# Patient Record
Sex: MALE | Race: WHITE | Employment: PART TIME | URBAN - METROPOLITAN AREA
[De-identification: names, ages, dates, MRNs, and addresses within clinical notes are randomized per-mention and may not be internally consistent; named-entity substitution may affect disease eponyms.]

---

## 2019-11-26 ENCOUNTER — HOSPITAL ENCOUNTER (EMERGENCY)
Age: 34
Discharge: HOME | End: 2019-11-26
Attending: EMERGENCY MEDICINE
Payer: COMMERCIAL

## 2019-11-26 ENCOUNTER — APPOINTMENT (OUTPATIENT)
Dept: GENERAL RADIOLOGY | Age: 34
End: 2019-11-26
Payer: COMMERCIAL

## 2019-11-26 VITALS
SYSTOLIC BLOOD PRESSURE: 132 MMHG | WEIGHT: 180 LBS | BODY MASS INDEX: 25.77 KG/M2 | TEMPERATURE: 97.8 F | RESPIRATION RATE: 16 BRPM | HEIGHT: 70 IN | OXYGEN SATURATION: 98 % | HEART RATE: 80 BPM | DIASTOLIC BLOOD PRESSURE: 68 MMHG

## 2019-11-26 DIAGNOSIS — M25.572 CHRONIC PAIN OF BOTH ANKLES: Primary | ICD-10-CM

## 2019-11-26 DIAGNOSIS — G89.29 CHRONIC PAIN OF BOTH ANKLES: Primary | ICD-10-CM

## 2019-11-26 DIAGNOSIS — M25.571 CHRONIC PAIN OF BOTH ANKLES: Primary | ICD-10-CM

## 2019-11-26 PROCEDURE — 73630 X-RAY EXAM OF FOOT: CPT

## 2019-11-26 PROCEDURE — 73610 X-RAY EXAM OF ANKLE: CPT

## 2019-11-26 PROCEDURE — 99283 EMERGENCY DEPT VISIT LOW MDM: CPT

## 2019-11-26 RX ORDER — ASCORBIC ACID 500 MG
500 TABLET ORAL 2 TIMES DAILY
COMMUNITY
End: 2020-10-20 | Stop reason: ALTCHOICE

## 2019-11-26 RX ORDER — IBUPROFEN 400 MG/1
400 TABLET ORAL EVERY 6 HOURS PRN
COMMUNITY
End: 2020-10-20 | Stop reason: ALTCHOICE

## 2019-11-26 RX ORDER — ASPIRIN 325 MG
325 TABLET ORAL DAILY
COMMUNITY

## 2019-11-26 ASSESSMENT — PAIN DESCRIPTION - LOCATION: LOCATION: FOOT;ANKLE

## 2019-11-26 ASSESSMENT — PAIN SCALES - GENERAL: PAINLEVEL_OUTOF10: 7

## 2019-11-26 ASSESSMENT — PAIN DESCRIPTION - PAIN TYPE: TYPE: ACUTE PAIN

## 2019-11-26 ASSESSMENT — PAIN DESCRIPTION - ORIENTATION: ORIENTATION: RIGHT;LEFT

## 2019-12-04 ENCOUNTER — HOSPITAL ENCOUNTER (EMERGENCY)
Age: 34
Discharge: HOME | End: 2019-12-04
Attending: EMERGENCY MEDICINE
Payer: COMMERCIAL

## 2019-12-04 ENCOUNTER — APPOINTMENT (OUTPATIENT)
Dept: GENERAL RADIOLOGY | Age: 34
End: 2019-12-04
Payer: COMMERCIAL

## 2019-12-04 VITALS
HEIGHT: 70 IN | WEIGHT: 180 LBS | OXYGEN SATURATION: 98 % | HEART RATE: 90 BPM | RESPIRATION RATE: 16 BRPM | TEMPERATURE: 98.7 F | DIASTOLIC BLOOD PRESSURE: 56 MMHG | BODY MASS INDEX: 25.77 KG/M2 | SYSTOLIC BLOOD PRESSURE: 130 MMHG

## 2019-12-04 DIAGNOSIS — J40 BRONCHITIS: Primary | ICD-10-CM

## 2019-12-04 LAB
DIRECT EXAM: NORMAL
Lab: NORMAL
SPECIMEN DESCRIPTION: NORMAL

## 2019-12-04 PROCEDURE — 71046 X-RAY EXAM CHEST 2 VIEWS: CPT

## 2019-12-04 PROCEDURE — 87880 STREP A ASSAY W/OPTIC: CPT

## 2019-12-04 PROCEDURE — 99283 EMERGENCY DEPT VISIT LOW MDM: CPT

## 2019-12-04 RX ORDER — GUAIFENESIN/DEXTROMETHORPHAN 100-10MG/5
5 SYRUP ORAL 3 TIMES DAILY PRN
Qty: 120 ML | Refills: 0 | Status: SHIPPED | OUTPATIENT
Start: 2019-12-04 | End: 2019-12-14

## 2019-12-04 RX ORDER — METHYLPREDNISOLONE 4 MG/1
TABLET ORAL
Qty: 1 KIT | Refills: 0 | Status: SHIPPED | OUTPATIENT
Start: 2019-12-04 | End: 2019-12-10

## 2019-12-04 RX ORDER — PSEUDOEPHEDRINE HYDROCHLORIDE 30 MG/1
30 TABLET ORAL EVERY 4 HOURS PRN
Qty: 24 TABLET | Refills: 1 | Status: SHIPPED | OUTPATIENT
Start: 2019-12-04 | End: 2020-10-20 | Stop reason: ALTCHOICE

## 2019-12-04 SDOH — HEALTH STABILITY: MENTAL HEALTH: HOW OFTEN DO YOU HAVE A DRINK CONTAINING ALCOHOL?: NEVER

## 2019-12-04 ASSESSMENT — PAIN SCALES - GENERAL: PAINLEVEL_OUTOF10: 4

## 2019-12-04 ASSESSMENT — PAIN DESCRIPTION - ORIENTATION: ORIENTATION: RIGHT

## 2019-12-04 ASSESSMENT — PAIN DESCRIPTION - LOCATION: LOCATION: EAR

## 2019-12-04 ASSESSMENT — PAIN DESCRIPTION - PAIN TYPE: TYPE: ACUTE PAIN

## 2020-10-20 ENCOUNTER — OFFICE VISIT (OUTPATIENT)
Dept: FAMILY MEDICINE CLINIC | Age: 35
End: 2020-10-20
Payer: COMMERCIAL

## 2020-10-20 VITALS
HEART RATE: 96 BPM | OXYGEN SATURATION: 98 % | SYSTOLIC BLOOD PRESSURE: 132 MMHG | DIASTOLIC BLOOD PRESSURE: 72 MMHG | HEIGHT: 68 IN | TEMPERATURE: 98.3 F | WEIGHT: 189.6 LBS | BODY MASS INDEX: 28.73 KG/M2

## 2020-10-20 PROBLEM — F17.210 NICOTINE DEPENDENCE, CIGARETTES, UNCOMPLICATED: Status: ACTIVE | Noted: 2019-07-16

## 2020-10-20 PROCEDURE — 99204 OFFICE O/P NEW MOD 45 MIN: CPT | Performed by: FAMILY MEDICINE

## 2020-10-20 PROCEDURE — G8484 FLU IMMUNIZE NO ADMIN: HCPCS | Performed by: FAMILY MEDICINE

## 2020-10-20 PROCEDURE — 4004F PT TOBACCO SCREEN RCVD TLK: CPT | Performed by: FAMILY MEDICINE

## 2020-10-20 PROCEDURE — G8419 CALC BMI OUT NRM PARAM NOF/U: HCPCS | Performed by: FAMILY MEDICINE

## 2020-10-20 PROCEDURE — G8427 DOCREV CUR MEDS BY ELIG CLIN: HCPCS | Performed by: FAMILY MEDICINE

## 2020-10-20 SDOH — ECONOMIC STABILITY: FOOD INSECURITY: WITHIN THE PAST 12 MONTHS, THE FOOD YOU BOUGHT JUST DIDN'T LAST AND YOU DIDN'T HAVE MONEY TO GET MORE.: SOMETIMES TRUE

## 2020-10-20 SDOH — ECONOMIC STABILITY: TRANSPORTATION INSECURITY
IN THE PAST 12 MONTHS, HAS THE LACK OF TRANSPORTATION KEPT YOU FROM MEDICAL APPOINTMENTS OR FROM GETTING MEDICATIONS?: NO

## 2020-10-20 SDOH — ECONOMIC STABILITY: TRANSPORTATION INSECURITY
IN THE PAST 12 MONTHS, HAS LACK OF TRANSPORTATION KEPT YOU FROM MEETINGS, WORK, OR FROM GETTING THINGS NEEDED FOR DAILY LIVING?: NO

## 2020-10-20 SDOH — ECONOMIC STABILITY: FOOD INSECURITY: WITHIN THE PAST 12 MONTHS, YOU WORRIED THAT YOUR FOOD WOULD RUN OUT BEFORE YOU GOT MONEY TO BUY MORE.: SOMETIMES TRUE

## 2020-10-20 SDOH — ECONOMIC STABILITY: INCOME INSECURITY: HOW HARD IS IT FOR YOU TO PAY FOR THE VERY BASICS LIKE FOOD, HOUSING, MEDICAL CARE, AND HEATING?: HARD

## 2020-10-20 ASSESSMENT — ENCOUNTER SYMPTOMS
APNEA: 0
CHEST TIGHTNESS: 0
SHORTNESS OF BREATH: 1
NAUSEA: 0
COUGH: 1
VOICE CHANGE: 0
ABDOMINAL DISTENTION: 0
COLOR CHANGE: 0
DIARRHEA: 0
CONSTIPATION: 0
ABDOMINAL PAIN: 0
TROUBLE SWALLOWING: 0
EYE DISCHARGE: 0
WHEEZING: 0

## 2020-10-20 ASSESSMENT — PATIENT HEALTH QUESTIONNAIRE - PHQ9
SUM OF ALL RESPONSES TO PHQ QUESTIONS 1-9: 0
SUM OF ALL RESPONSES TO PHQ QUESTIONS 1-9: 0
1. LITTLE INTEREST OR PLEASURE IN DOING THINGS: 0
SUM OF ALL RESPONSES TO PHQ QUESTIONS 1-9: 0
2. FEELING DOWN, DEPRESSED OR HOPELESS: 0
SUM OF ALL RESPONSES TO PHQ9 QUESTIONS 1 & 2: 0

## 2020-10-20 NOTE — PROGRESS NOTES
felt like glass cutting him in his chest.  Cinder block weight sensation on his chest.  Arm would go numb. Sitting position frequently at home, Lots of computer. Lying down frequently due to crushing pain at times. Laughing can aggrivate it. 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 Head injury- hit an  I beam, steel, and required 7 staples in his head, positive loss of consciousness. He says there is a  History of peripheral neuropathy, fibromyalgia diagnosis for him in the past      Current Outpatient Medications on File Prior to Visit   Medication Sig Dispense Refill    aspirin 325 MG tablet Take 325 mg by mouth daily       No current facility-administered medications on file prior to visit.       Past Medical History:   Diagnosis Date    Mononucleosis     as child     Past Surgical History:   Procedure Laterality Date    WISDOM TOOTH EXTRACTION Left     bottom     Social History     Socioeconomic History    Marital status: Single     Spouse name: Not on file    Number of children: Not on file    Years of education: Not on file    Highest education level: Not on file   Occupational History    Not on file   Social Needs    Financial resource strain: Hard    Food insecurity     Worry: Sometimes true     Inability: Sometimes true    Transportation needs     Medical: No     Non-medical: No   Tobacco Use    Smoking status: Current Every Day Smoker     Packs/day: 1.00     Years: 20.00     Pack years: 20.00     Types: Cigarettes    Smokeless tobacco: Never Used   Substance and Sexual Activity    Alcohol use: Never     Frequency: Never     Comment: rarely    Drug use: Never    Sexual activity: Not on file   Lifestyle    Physical activity     Days per week: Not on file     Minutes per session: Not on file    Stress: Not on file   Relationships    Social connections     Talks on phone: Not on file     Gets together: Not on file     Attends Sabian[employer] service: Not on Head: Normocephalic and atraumatic. Right Ear: External ear normal.      Left Ear: External ear normal.      Nose: Nose normal.   Eyes:      General:         Right eye: No discharge. Left eye: No discharge. Conjunctiva/sclera: Conjunctivae normal.      Pupils: Pupils are equal, round, and reactive to light. Neck:      Musculoskeletal: Neck supple. Thyroid: No thyromegaly. Cardiovascular:      Rate and Rhythm: Normal rate and regular rhythm. Pulmonary:      Effort: Pulmonary effort is normal. No respiratory distress. Abdominal:      General: There is no distension. Skin:     General: Skin is warm and dry. Neurological:      Mental Status: He is alert and oriented to person, place, and time. Coordination: Coordination normal.   Psychiatric:         Thought Content: Thought content normal.         Judgment: Judgment normal.         No results found for this visit on 10/20/20. No results found for this or any previous visit (from the past 2016 hour(s)). Assessment:       Diagnosis Orders   1. Chest pain, unspecified type  Lipid, Fasting    CBC Auto Differential    Comprehensive Metabolic Panel    Full PFT Study With Bronchodilator    XR CHEST (2 VW)   2. Shortness of breath  Full PFT Study With Bronchodilator    XR CHEST (2 VW)   3. Cervical radiculopathy  XR CERVICAL SPINE (4-5 VIEWS)   4.  Nicotine dependence, cigarettes, uncomplicated           Orders Placed This Encounter   Procedures    XR CHEST (2 VW)     Standing Status:   Future     Standing Expiration Date:   10/20/2021    XR CERVICAL SPINE (4-5 VIEWS)     Standing Status:   Future     Standing Expiration Date:   10/20/2021     Order Specific Question:   Reason for exam:     Answer:   chest pain, arm numb    Lipid, Fasting     Standing Status:   Future     Standing Expiration Date:   10/20/2021    CBC Auto Differential     Standing Status:   Future     Standing Expiration Date:   10/20/2021    Comprehensive Metabolic Panel     Standing Status:   Future     Standing Expiration Date:   10/20/2021    Full PFT Study With Bronchodilator     Standing Status:   Future     Standing Expiration Date:   10/20/2021         Plan:   Return in about 4 weeks (around 11/17/2020) for for review of outcome of today's recommendation. Patient Instructions   Discussed with patient rationale for ordering each of the test.  At this point I do not see an indication for cardiac testing. His history is much more consistent with long abnormalities as well as potential neck injury with subsequent radiculopathy. This note was partially created with the assistance of dictation. This may lead to grammatical or spelling errors. Roel Mccarthy M.D.

## 2020-10-20 NOTE — PATIENT INSTRUCTIONS
Discussed with patient rationale for ordering each of the test.  At this point I do not see an indication for cardiac testing. His history is much more consistent with long abnormalities as well as potential neck injury with subsequent radiculopathy.

## 2021-03-01 ENCOUNTER — TELEPHONE (OUTPATIENT)
Dept: FAMILY MEDICINE CLINIC | Age: 36
End: 2021-03-01

## 2021-03-01 NOTE — TELEPHONE ENCOUNTER
Pt is asking for a work note stating that the pt is not able to function at work at this time. Pt is having a lot of pressure. When pt wears a mask it intensifies the symptoms. Pt feels exhausted after moving very little. Please advise. Pt's phone number is [phone]. Pt is scheduled for a vv appt. Tomorrow.

## 2021-03-02 ENCOUNTER — VIRTUAL VISIT (OUTPATIENT)
Dept: FAMILY MEDICINE CLINIC | Age: 36
End: 2021-03-02
Payer: COMMERCIAL

## 2021-03-02 DIAGNOSIS — R53.83 OTHER FATIGUE: ICD-10-CM

## 2021-03-02 DIAGNOSIS — M79.601 PARESTHESIA AND PAIN OF BOTH UPPER EXTREMITIES: ICD-10-CM

## 2021-03-02 DIAGNOSIS — R20.2 PARESTHESIA AND PAIN OF BOTH UPPER EXTREMITIES: ICD-10-CM

## 2021-03-02 DIAGNOSIS — M79.602 PARESTHESIA AND PAIN OF BOTH UPPER EXTREMITIES: ICD-10-CM

## 2021-03-02 DIAGNOSIS — R07.9 CHEST PAIN, UNSPECIFIED TYPE: Primary | ICD-10-CM

## 2021-03-02 PROCEDURE — G8427 DOCREV CUR MEDS BY ELIG CLIN: HCPCS | Performed by: FAMILY MEDICINE

## 2021-03-02 PROCEDURE — 99214 OFFICE O/P EST MOD 30 MIN: CPT | Performed by: FAMILY MEDICINE

## 2021-03-02 ASSESSMENT — PATIENT HEALTH QUESTIONNAIRE - PHQ9
SUM OF ALL RESPONSES TO PHQ QUESTIONS 1-9: 0
SUM OF ALL RESPONSES TO PHQ QUESTIONS 1-9: 0

## 2021-03-02 NOTE — PROGRESS NOTES
organization: Not on file     Attends meetings of clubs or organizations: Not on file     Relationship status: Not on file    Intimate partner violence     Fear of current or ex partner: Not on file     Emotionally abused: Not on file     Physically abused: Not on file     Forced sexual activity: Not on file   Other Topics Concern    Not on file   Social History Narrative    Not on file     Family History   Problem Relation Age of Onset    No Known Problems Mother     Heart Disease Father      Allergies:  Patient has no known allergies. Review of Systems   Constitutional: Positive for fatigue. Negative for appetite change, chills and fever. HENT: Negative for congestion and sore throat. Respiratory: Positive for shortness of breath. Negative for cough and chest tightness. Cardiovascular: Positive for chest pain. Negative for palpitations and leg swelling. PHYSICAL EXAM/ RESULTS    (Limited exam: only performed what is available through a visual exam during the video encounter as indicated due to the restrictions of the COVID-19 pandemic)    Patient-Reported Vitals 3/2/2021   Patient-Reported Weight 180lbs   Patient-Reported Height 5 8             Physical Exam  Vitals signs (All exam findings are noted during patient movement during video exam) reviewed. Constitutional:       General: He is not in acute distress. Appearance: Normal appearance. He is well-developed. He is not ill-appearing or diaphoretic. HENT:      Head: Normocephalic and atraumatic. No right periorbital erythema or left periorbital erythema. Hair is normal.      Jaw: No swelling or pain on movement. Right Ear: Hearing and external ear normal.      Left Ear: Hearing and external ear normal.      Ears:      Comments: External ears are normal shape and even level placement on the head     Nose: No nasal deformity. Right Nostril: No epistaxis. Left Nostril: No epistaxis. Mouth/Throat:      Lips: Pink. Comments: Lips have appropriate movement with conversation  Eyes:      General: Lids are normal. Vision grossly intact. Gaze aligned appropriately. No allergic shiner. Right eye: No discharge. Left eye: No discharge. Extraocular Movements: Extraocular movements intact. Conjunctiva/sclera: Conjunctivae normal.      Comments: Pupils equal   Neck:      Musculoskeletal: Normal range of motion. No erythema or pain with movement. Thyroid: No thyromegaly. Trachea: No tracheal deviation. Pulmonary:      Effort: No tachypnea, accessory muscle usage or respiratory distress. Comments: No difficulty with conversation  Musculoskeletal:      Comments: Range of motion of upper and lower extremity large muscle groups are normal during ambulation. Gait normal   Skin:     Coloration: Skin is not cyanotic, jaundiced or pale. Findings: No acne. Comments: No evidence of abnormal hair loss. Facial skin without defect. Neurological:      Mental Status: He is alert and oriented to person, place, and time. Cranial Nerves: No cranial nerve deficit, dysarthria or facial asymmetry. Coordination: Coordination normal.      Gait: Gait is intact. Comments: Motor function intact for gait and range of motion of large muscle groups of extremities   Psychiatric:         Attention and Perception: Attention and perception normal.         Mood and Affect: Mood and affect normal.         Speech: Speech normal.         Behavior: Behavior normal. Behavior is cooperative. Thought Content: Thought content normal.         Judgment: Judgment normal.      Comments: Slow speech pattern         No results found for this or any previous visit (from the past 2016 hour(s)). Assessment:       Diagnosis Orders   1. Chest pain, unspecified type  CBC Auto Differential    Comprehensive Metabolic Panel    TSH with Reflex   2.  Other fatigue  CBC Auto Differential    Comprehensive Metabolic Panel    TSH with Reflex    Testosterone Free and Total Male   3. Paresthesia and pain of both upper extremities  CBC Auto Differential    Comprehensive Metabolic Panel    TSH with Reflex         Orders Placed This Encounter   Procedures    CBC Auto Differential     Standing Status:   Future     Number of Occurrences:   1     Standing Expiration Date:   3/2/2022    Comprehensive Metabolic Panel     Standing Status:   Future     Number of Occurrences:   1     Standing Expiration Date:   3/2/2022    TSH with Reflex     Standing Status:   Future     Number of Occurrences:   1     Standing Expiration Date:   3/2/2022    Testosterone Free and Total Male     Standing Status:   Future     Number of Occurrences:   1     Standing Expiration Date:   3/2/2022         Plan:   Return in about 3 days (around 3/5/2021) for off work note for today through friday. Patient Instructions   Patient seems to demonstrate slow mentation during conversation. High suspect for thyroid disease. Labs ordered. In person appointment in 3 days to discuss results and come up with next injury plan plus evaluate patient's chest pain symptoms with a respiratory exam.      This visit ended at do not recall    The resources used for this visit were [employer] for chart access and [email or web address]      Lio Mcneal M.D. An  electronic signature was used to authenticate this note. --Constance Prakash MD on 3/3/2021 at 6:29 PM        Pursuant to the emergency declaration under the [facility] waiver authority and the [employer] Act, this Virtual  Visit was conducted, with patient's consent, to reduce the patient's risk of exposure to COVID-19 and provide continuity of care for an established patient. Services were provided through a video synchronous discussion virtually to substitute for in-person clinic visit.

## 2021-03-03 DIAGNOSIS — R07.9 CHEST PAIN, UNSPECIFIED TYPE: ICD-10-CM

## 2021-03-03 DIAGNOSIS — M79.601 PARESTHESIA AND PAIN OF BOTH UPPER EXTREMITIES: ICD-10-CM

## 2021-03-03 DIAGNOSIS — M79.602 PARESTHESIA AND PAIN OF BOTH UPPER EXTREMITIES: ICD-10-CM

## 2021-03-03 DIAGNOSIS — R20.2 PARESTHESIA AND PAIN OF BOTH UPPER EXTREMITIES: ICD-10-CM

## 2021-03-03 DIAGNOSIS — R53.83 OTHER FATIGUE: ICD-10-CM

## 2021-03-03 LAB
ALBUMIN SERPL-MCNC: 4.6 G/DL (ref 3.5–4.6)
ALP BLD-CCNC: 92 U/L (ref 35–104)
ALT SERPL-CCNC: 15 U/L (ref 0–41)
ANION GAP SERPL CALCULATED.3IONS-SCNC: 11 MEQ/L (ref 9–15)
AST SERPL-CCNC: 16 U/L (ref 0–40)
BASOPHILS ABSOLUTE: 0.1 K/UL (ref 0–0.2)
BASOPHILS RELATIVE PERCENT: 1.2 %
BILIRUB SERPL-MCNC: 0.5 MG/DL (ref 0.2–0.7)
BUN BLDV-MCNC: 8 MG/DL (ref 6–20)
CALCIUM SERPL-MCNC: 9.8 MG/DL (ref 8.5–9.9)
CHLORIDE BLD-SCNC: 104 MEQ/L (ref 95–107)
CO2: 25 MEQ/L (ref 20–31)
CREAT SERPL-MCNC: 0.91 MG/DL (ref 0.7–1.2)
EOSINOPHILS ABSOLUTE: 0.1 K/UL (ref 0–0.7)
EOSINOPHILS RELATIVE PERCENT: 1 %
GFR AFRICAN AMERICAN: >60
GFR NON-AFRICAN AMERICAN: >60
GLOBULIN: 3.1 G/DL (ref 2.3–3.5)
GLUCOSE BLD-MCNC: 90 MG/DL (ref 70–99)
HCT VFR BLD CALC: 42.6 % (ref 42–52)
HEMOGLOBIN: 14.2 G/DL (ref 14–18)
LYMPHOCYTES ABSOLUTE: 2.8 K/UL (ref 1–4.8)
LYMPHOCYTES RELATIVE PERCENT: 28.2 %
MCH RBC QN AUTO: 30 PG (ref 27–31.3)
MCHC RBC AUTO-ENTMCNC: 33.4 % (ref 33–37)
MCV RBC AUTO: 90 FL (ref 80–100)
MONOCYTES ABSOLUTE: 0.4 K/UL (ref 0.2–0.8)
MONOCYTES RELATIVE PERCENT: 4.5 %
NEUTROPHILS ABSOLUTE: 6.4 K/UL (ref 1.4–6.5)
NEUTROPHILS RELATIVE PERCENT: 65.1 %
PDW BLD-RTO: 13.3 % (ref 11.5–14.5)
PLATELET # BLD: 211 K/UL (ref 130–400)
POTASSIUM SERPL-SCNC: 3.8 MEQ/L (ref 3.4–4.9)
RBC # BLD: 4.74 M/UL (ref 4.7–6.1)
SODIUM BLD-SCNC: 140 MEQ/L (ref 135–144)
TOTAL PROTEIN: 7.7 G/DL (ref 6.3–8)
TSH REFLEX: 1.47 UIU/ML (ref 0.44–3.86)
WBC # BLD: 9.9 K/UL (ref 4.8–10.8)

## 2021-03-03 ASSESSMENT — ENCOUNTER SYMPTOMS
CHEST TIGHTNESS: 0
SORE THROAT: 0
SHORTNESS OF BREATH: 1
COUGH: 0

## 2021-03-03 ASSESSMENT — VISUAL ACUITY: OU: 1

## 2021-03-05 ENCOUNTER — OFFICE VISIT (OUTPATIENT)
Dept: FAMILY MEDICINE CLINIC | Age: 36
End: 2021-03-05
Payer: COMMERCIAL

## 2021-03-05 VITALS
HEART RATE: 70 BPM | TEMPERATURE: 97.5 F | OXYGEN SATURATION: 99 % | HEIGHT: 68 IN | SYSTOLIC BLOOD PRESSURE: 128 MMHG | DIASTOLIC BLOOD PRESSURE: 68 MMHG | WEIGHT: 187.5 LBS | BODY MASS INDEX: 28.42 KG/M2

## 2021-03-05 DIAGNOSIS — R53.83 OTHER FATIGUE: ICD-10-CM

## 2021-03-05 DIAGNOSIS — K21.9 GASTROESOPHAGEAL REFLUX DISEASE WITHOUT ESOPHAGITIS: Primary | ICD-10-CM

## 2021-03-05 DIAGNOSIS — Z72.0 TOBACCO ABUSE: ICD-10-CM

## 2021-03-05 LAB
SEX HORMONE BINDING GLOBULIN: 19 NMOL/L (ref 11–80)
TESTOSTERONE FREE PERCENT: 2.3 % (ref 1.6–2.9)
TESTOSTERONE FREE, CALC: 82 PG/ML (ref 47–244)
TESTOSTERONE TOTAL-MALE: 351 NG/DL (ref 300–1080)

## 2021-03-05 PROCEDURE — G8427 DOCREV CUR MEDS BY ELIG CLIN: HCPCS | Performed by: FAMILY MEDICINE

## 2021-03-05 PROCEDURE — G8419 CALC BMI OUT NRM PARAM NOF/U: HCPCS | Performed by: FAMILY MEDICINE

## 2021-03-05 PROCEDURE — 99214 OFFICE O/P EST MOD 30 MIN: CPT | Performed by: FAMILY MEDICINE

## 2021-03-05 PROCEDURE — G8484 FLU IMMUNIZE NO ADMIN: HCPCS | Performed by: FAMILY MEDICINE

## 2021-03-05 PROCEDURE — 4004F PT TOBACCO SCREEN RCVD TLK: CPT | Performed by: FAMILY MEDICINE

## 2021-03-05 RX ORDER — OMEPRAZOLE 20 MG/1
20 CAPSULE, DELAYED RELEASE ORAL
Qty: 60 CAPSULE | Refills: 5 | Status: SHIPPED | OUTPATIENT
Start: 2021-03-05 | End: 2022-04-28

## 2021-03-05 NOTE — PROGRESS NOTES
Diagnosis Orders   1. Gastroesophageal reflux disease without esophagitis  omeprazole (PRILOSEC) 20 MG delayed release capsule   2. Tobacco abuse     3. Other fatigue       Return for To be determined after labs completed. Patient Instructions   Initiate omeprazole twice a day. Discontinue smoking. Await testosterone labs. If testosterone is negative as cause of symptoms will look into psychiatric and neurologic potential causes. Patient Education        Stopping Smokeless Tobacco Use: Care Instructions  Your Care Instructions     Smokeless tobacco comes in many forms, such as snuff and chewing tobacco:  · Snuff is finely ground tobacco sold in cans or pouches. Most of the time, snuff is used by putting a \"pinch\" or \"dip\" between the lower lip or cheek and the gum. · Chewing tobacco is sold as loose leaves, plugs, or twists. It is chewed or placed between the cheek and the gum or teeth. There are plenty of reasons to stop using smokeless tobacco. These products are harmful. They are not risk-free alternatives to smoking. Smokeless tobacco contains nicotine, which is addicting. Though using smokeless tobacco is less harmful than smoking cigarettes, it can cause serious health problems, such as:  · White patches or red sores in your mouth that can turn into mouth cancer involving the lip, tongue, or cheek. · Tooth loss and other dental problems. · Gum disease. Your gums may pull away from your teeth and not grow back. People who use smokeless tobacco crave the nicotine in it. Giving up smokeless tobacco is much harder than simply changing a habit. Your body has to stop craving the nicotine. It is hard to quit, but you can do it. Many tools are available for people who want to quit using smokeless tobacco. You may find that combining tools works best for you. There are several steps to quitting. First you get ready to quit. Then you get support to help you.  After that, you learn new skills and behaviors to quit. For many people, a necessary step is getting and using medicine. Your doctor will help you set up the plan that best meets your needs. You may want to attend a tobacco cessation program. When you choose a program, look for one that has proven success. Ask your doctor for ideas. You will greatly increase your chances of success if you take medicine as well as get counseling or join a cessation program.  Some of the changes you feel when you first quit smokeless tobacco are uncomfortable. Your body will miss the nicotine at first, and you may feel short-tempered and grumpy. You may have trouble sleeping or concentrating. Medicine can help you deal with these symptoms. You may struggle with changing your habits and rituals. The last step is the tricky one: Be prepared for the urge to use smokeless tobacco to continue for a time. This is a lot to deal with, but keep at it. You will feel better. Follow-up care is a key part of your treatment and safety. Be sure to make and go to all appointments, and call your doctor if you are having problems. It's also a good idea to know your test results and keep a list of the medicines you take. How can you care for yourself at home? · Ask your family, friends, and coworkers for support. You have a better chance of quitting if you have help and support. · Join a support group for people who are trying to quit using smokeless tobacco.  · Set a quit date. Pick your date carefully so that it is not right in the middle of a big deadline or stressful time. After you quit, do not use smokeless tobacco even once. Get rid of all spit cups, cans, and pouches after your last use. Clean your house and your clothes so that they do not smell of tobacco.  · Learn how to be a non-user. Think about ways you can avoid those things that make you reach for tobacco.  ? Learn some ways to deal with cravings, like calling a friend or going for a walk. Cravings often pass. ?  Avoid situations that put you at greatest risk for using smokeless tobacco. For some people, it is hard to spend time with friends without dipping or chewing. For others, they might skip a coffee break with coworkers who smoke or use smokeless tobacco.  ? Change your daily routine. Take a different route to work, or eat a meal in a different place. · Cut down on stress. Calm yourself or release tension by doing an activity you enjoy, such as reading a book, taking a hot bath, or gardening. · Talk to your doctor or pharmacist about nicotine replacement therapy. You still get nicotine, but you do not use tobacco. Nicotine replacement products help you slowly reduce the amount of nicotine you need. Many of these products are available over the counter. They include nicotine patches, gum, lozenges, and inhalers. · Ask your doctor about bupropion (Wellbutrin) or varenicline (Chantix), which are prescription medicines. They do not contain nicotine. They help you by reducing withdrawal symptoms, such as stress and anxiety. · Get regular exercise. Having healthy habits will help your body move past its craving for nicotine. · Be prepared to keep trying. Most people are not successful the first few times they try to quit. Do not get mad at yourself if you use tobacco again. Make a list of things you learned, and think about when you want to try again, such as next week, next month, or next year. Where can you learn more? Go to https://GreenTec-USAsemaj.healthNeedFeed. org and sign in to your [employer] account. Enter I491 in the [facility] box to learn more about \"Stopping Smokeless Tobacco Use: Care Instructions. \"     If you do not have an account, please click on the \"Sign Up Now\" link. Current as of: March 12, 2020               Content Version: 12.6  © 6938-3369 [employer]. Care instructions adapted under license by [facility] ([facility]).  If you have questions about a medical condition or this instruction, always ask your healthcare professional. [place] [id number] any warranty or liability for your use of this information. Subjective:      Patient ID: Mukund Davila is a 38 y.o. male who presents for:  Chief Complaint   Patient presents with    Chest Pain     3 day follow up - Pt states that there has been no change in his chest SX       Takes a lot of effort to move. Both sides of the body equally. He \"does not feel young anymore\"  Feels like he has a subtle burning chronic sensation in his arms. If he sleeps 7 hours he wakes up in pain. Never feels good. Only time any change is in the water, like a jacuzzi, he feels better. He will often sleep after that. In that instance he wakes from the nap feeling somewhat better. He notes an increase in acid reflux, refluxed into mouth that woke him up this week. He has taken medication for acid in the past it did help    He notes intermittent lymph node swelling on L neck and L axilla. Often wakes sweating. Current Outpatient Medications on File Prior to Visit   Medication Sig Dispense Refill    aspirin 325 MG tablet Take 325 mg by mouth daily       No current facility-administered medications on file prior to visit.       Past Medical History:   Diagnosis Date    Mononucleosis     as child     Past Surgical History:   Procedure Laterality Date    WISDOM TOOTH EXTRACTION Left     bottom     Social History     Socioeconomic History    Marital status: Single     Spouse name: Not on file    Number of children: Not on file    Years of education: Not on file    Highest education level: Not on file   Occupational History    Not on file   Social Needs    Financial resource strain: Hard    Food insecurity     Worry: Sometimes true     Inability: Sometimes true    Transportation needs     Medical: No     Non-medical: No   Tobacco Use    Smoking status: Current Every Day Smoker     Packs/day: 1.00     Years: 20.00     Pack years: 20.00     Types: Cigarettes    Smokeless tobacco: Never Used   Substance and Sexual Activity    Alcohol use: Never     Frequency: Never     Comment: rarely    Drug use: Never    Sexual activity: Not on file   Lifestyle    Physical activity     Days per week: Not on file     Minutes per session: Not on file    Stress: Not on file   Relationships    Social connections     Talks on phone: Not on file     Gets together: Not on file     Attends Hoahaoism service: Not on file     Active member of club or organization: Not on file     Attends meetings of clubs or organizations: Not on file     Relationship status: Not on file    Intimate partner violence     Fear of current or ex partner: Not on file     Emotionally abused: Not on file     Physically abused: Not on file     Forced sexual activity: Not on file   Other Topics Concern    Not on file   Social History Narrative    Not on file     Family History   Problem Relation Age of Onset    No Known Problems Mother     Heart Disease Father      Allergies:  Patient has no known allergies. Review of Systems   Constitutional:        See HPI       Objective:   /68   Pulse 70   Temp 97.5 °F (36.4 °C)   Ht 5' 8\" (1.727 m)   Wt 187 lb 8 oz (85 kg)   SpO2 99%   BMI 28.51 kg/m²     Physical Exam  Constitutional:       Appearance: Normal appearance. He is well-developed. He is not ill-appearing or diaphoretic. Comments: Vitals signs reviewed   HENT:      Head: Normocephalic and atraumatic. Right Ear: Hearing and external ear normal.      Left Ear: Hearing and external ear normal.      Nose: No nasal deformity or rhinorrhea. Eyes:      General: Lids are normal.         Right eye: No discharge. Left eye: No discharge. Extraocular Movements:      Right eye: No nystagmus. Left eye: No nystagmus. Conjunctiva/sclera: Conjunctivae normal.      Right eye: No hemorrhage. Left eye: No hemorrhage.      Pupils: Pupils are equal, round, and reactive to light. Neck:      Musculoskeletal: Full passive range of motion without pain and neck supple. Normal range of motion. Thyroid: No thyroid mass or thyromegaly. Vascular: Normal carotid pulses. No carotid bruit or JVD. Trachea: No tracheal tenderness or tracheal deviation. Cardiovascular:      Rate and Rhythm: Normal rate and regular rhythm. Chest Wall: PMI displaced: normal location PMI. Pulses:           Carotid pulses are 2+ on the right side and 2+ on the left side. Radial pulses are 2+ on the right side and 2+ on the left side. Heart sounds: S1 normal and S2 normal. No murmur. No friction rub. No gallop. No S3 sounds. Pulmonary:      Effort: Pulmonary effort is normal. No accessory muscle usage or respiratory distress. Breath sounds: Normal breath sounds. Chest:      Chest wall: No deformity. Abdominal:      General: There is no distension. Musculoskeletal:         General: No deformity. Cervical back: He exhibits normal range of motion, no tenderness and no deformity. Lymphadenopathy:      Cervical:      Right cervical: No superficial cervical adenopathy. Left cervical: No superficial cervical adenopathy. Upper Body:      Right upper body: No supraclavicular adenopathy. Left upper body: No supraclavicular adenopathy. Skin:     General: Skin is warm and dry. Findings: No bruising or rash. Nails: There is no clubbing. Neurological:      Mental Status: He is alert. Cranial Nerves: Cranial nerve deficit: CN II-XII GI without obvious deficit. Sensory: Sensory deficit: grossly intact for hands. Motor: No tremor. Atrophy: B UE and LE without atrophy. Abnormal muscle tone: B UE and LE have normal tone. Coordination: Coordination normal.      Gait: Gait normal.   Psychiatric:         Attention and Perception: He is attentive. Mood and Affect: Mood is not anxious.  Affect is not inappropriate. Speech: Speech normal.         Behavior: Behavior is not agitated. Behavior is cooperative. Judgment: Judgment normal.         No results found for this visit on 03/05/21. Recent Results (from the past 2016 hour(s))   TSH with Reflex    Collection Time: 03/03/21  1:25 PM   Result Value Ref Range    TSH 1.470 0.440 - 3.860 uIU/mL   Comprehensive Metabolic Panel    Collection Time: 03/03/21  1:25 PM   Result Value Ref Range    Sodium 140 135 - 144 mEq/L    Potassium 3.8 3.4 - 4.9 mEq/L    Chloride 104 95 - 107 mEq/L    CO2 25 20 - 31 mEq/L    Anion Gap 11 9 - 15 mEq/L    Glucose 90 70 - 99 mg/dL    BUN 8 6 - 20 mg/dL    CREATININE 0.91 0.70 - 1.20 mg/dL    GFR Non-African American >60.0 >60    GFR  >60.0 >60    Calcium 9.8 8.5 - 9.9 mg/dL    Total Protein 7.7 6.3 - 8.0 g/dL    Albumin 4.6 3.5 - 4.6 g/dL    Total Bilirubin 0.5 0.2 - 0.7 mg/dL    Alkaline Phosphatase 92 35 - 104 U/L    ALT 15 0 - 41 U/L    AST 16 0 - 40 U/L    Globulin 3.1 2.3 - 3.5 g/dL   CBC Auto Differential    Collection Time: 03/03/21  1:25 PM   Result Value Ref Range    WBC 9.9 4.8 - 10.8 K/uL    RBC 4.74 4.70 - 6.10 M/uL    Hemoglobin 14.2 14.0 - 18.0 g/dL    Hematocrit 42.6 42.0 - 52.0 %    MCV 90.0 80.0 - 100.0 fL    MCH 30.0 27.0 - 31.3 pg    MCHC 33.4 33.0 - 37.0 %    RDW 13.3 11.5 - 14.5 %    Platelets 435 935 - 417 K/uL    Neutrophils % 65.1 %    Lymphocytes % 28.2 %    Monocytes % 4.5 %    Eosinophils % 1.0 %    Basophils % 1.2 %    Neutrophils Absolute 6.4 1.4 - 6.5 K/uL    Lymphocytes Absolute 2.8 1.0 - 4.8 K/uL    Monocytes Absolute 0.4 0.2 - 0.8 K/uL    Eosinophils Absolute 0.1 0.0 - 0.7 K/uL    Basophils Absolute 0.1 0.0 - 0.2 K/uL           Assessment:       Diagnosis Orders   1. Gastroesophageal reflux disease without esophagitis  omeprazole (PRILOSEC) 20 MG delayed release capsule   2. Tobacco abuse     3.  Other fatigue           No orders of the defined types were placed in needs. You may want to attend a tobacco cessation program. When you choose a program, look for one that has proven success. Ask your doctor for ideas. You will greatly increase your chances of success if you take medicine as well as get counseling or join a cessation program.  Some of the changes you feel when you first quit smokeless tobacco are uncomfortable. Your body will miss the nicotine at first, and you may feel short-tempered and grumpy. You may have trouble sleeping or concentrating. Medicine can help you deal with these symptoms. You may struggle with changing your habits and rituals. The last step is the tricky one: Be prepared for the urge to use smokeless tobacco to continue for a time. This is a lot to deal with, but keep at it. You will feel better. Follow-up care is a key part of your treatment and safety. Be sure to make and go to all appointments, and call your doctor if you are having problems. It's also a good idea to know your test results and keep a list of the medicines you take. How can you care for yourself at home? · Ask your family, friends, and coworkers for support. You have a better chance of quitting if you have help and support. · Join a support group for people who are trying to quit using smokeless tobacco.  · Set a quit date. Pick your date carefully so that it is not right in the middle of a big deadline or stressful time. After you quit, do not use smokeless tobacco even once. Get rid of all spit cups, cans, and pouches after your last use. Clean your house and your clothes so that they do not smell of tobacco.  · Learn how to be a non-user. Think about ways you can avoid those things that make you reach for tobacco.  ? Learn some ways to deal with cravings, like calling a friend or going for a walk. Cravings often pass. ?  Avoid situations that put you at greatest risk for using smokeless tobacco. For some people, it is hard to spend time with friends without dipping or chewing. For others, they might skip a coffee break with coworkers who smoke or use smokeless tobacco.  ? Change your daily routine. Take a different route to work, or eat a meal in a different place. · Cut down on stress. Calm yourself or release tension by doing an activity you enjoy, such as reading a book, taking a hot bath, or gardening. · Talk to your doctor or pharmacist about nicotine replacement therapy. You still get nicotine, but you do not use tobacco. Nicotine replacement products help you slowly reduce the amount of nicotine you need. Many of these products are available over the counter. They include nicotine patches, gum, lozenges, and inhalers. · Ask your doctor about bupropion (Wellbutrin) or varenicline (Chantix), which are prescription medicines. They do not contain nicotine. They help you by reducing withdrawal symptoms, such as stress and anxiety. · Get regular exercise. Having healthy habits will help your body move past its craving for nicotine. · Be prepared to keep trying. Most people are not successful the first few times they try to quit. Do not get mad at yourself if you use tobacco again. Make a list of things you learned, and think about when you want to try again, such as next week, next month, or next year. Where can you learn more? Go to https://[employer] Ex[employer] Technology.Aspects Software. org and sign in to your [employer] account. Enter B577 in the [facility] box to learn more about \"Stopping Smokeless Tobacco Use: Care Instructions. \"     If you do not have an account, please click on the \"Sign Up Now\" link. Current as of: March 12, 2020               Content Version: 12.6  © 7752-1086 [employer], Incorporated. Care instructions adapted under license by [facility] ([facility]). If you have questions about a medical condition or this instruction, always ask your healthcare professional. [address] any warranty or liability for your use of this information. This note was partially created with the assistance of dictation. This may lead to grammatical or spelling errors. Roel Moraes M.D.

## 2021-03-05 NOTE — PATIENT INSTRUCTIONS
Initiate omeprazole twice a day. Discontinue smoking. Await testosterone labs. If testosterone is negative as cause of symptoms will look into psychiatric and neurologic potential causes. Patient Education        Stopping Smokeless Tobacco Use: Care Instructions  Your Care Instructions     Smokeless tobacco comes in many forms, such as snuff and chewing tobacco:  · Snuff is finely ground tobacco sold in cans or pouches. Most of the time, snuff is used by putting a \"pinch\" or \"dip\" between the lower lip or cheek and the gum. · Chewing tobacco is sold as loose leaves, plugs, or twists. It is chewed or placed between the cheek and the gum or teeth. There are plenty of reasons to stop using smokeless tobacco. These products are harmful. They are not risk-free alternatives to smoking. Smokeless tobacco contains nicotine, which is addicting. Though using smokeless tobacco is less harmful than smoking cigarettes, it can cause serious health problems, such as:  · White patches or red sores in your mouth that can turn into mouth cancer involving the lip, tongue, or cheek. · Tooth loss and other dental problems. · Gum disease. Your gums may pull away from your teeth and not grow back. People who use smokeless tobacco crave the nicotine in it. Giving up smokeless tobacco is much harder than simply changing a habit. Your body has to stop craving the nicotine. It is hard to quit, but you can do it. Many tools are available for people who want to quit using smokeless tobacco. You may find that combining tools works best for you. There are several steps to quitting. First you get ready to quit. Then you get support to help you. After that, you learn new skills and behaviors to quit. For many people, a necessary step is getting and using medicine. Your doctor will help you set up the plan that best meets your needs. You may want to attend a tobacco cessation program. When you choose a program, look for one that has proven success. Ask your doctor for ideas. You will greatly increase your chances of success if you take medicine as well as get counseling or join a cessation program.  Some of the changes you feel when you first quit smokeless tobacco are uncomfortable. Your body will miss the nicotine at first, and you may feel short-tempered and grumpy. You may have trouble sleeping or concentrating. Medicine can help you deal with these symptoms. You may struggle with changing your habits and rituals. The last step is the tricky one: Be prepared for the urge to use smokeless tobacco to continue for a time. This is a lot to deal with, but keep at it. You will feel better. Follow-up care is a key part of your treatment and safety. Be sure to make and go to all appointments, and call your doctor if you are having problems. It's also a good idea to know your test results and keep a list of the medicines you take. How can you care for yourself at home? · Ask your family, friends, and coworkers for support. You have a better chance of quitting if you have help and support. · Join a support group for people who are trying to quit using smokeless tobacco.  · Set a quit date. Pick your date carefully so that it is not right in the middle of a big deadline or stressful time. After you quit, do not use smokeless tobacco even once. Get rid of all spit cups, cans, and pouches after your last use. Clean your house and your clothes so that they do not smell of tobacco.  · Learn how to be a non-user. Think about ways you can avoid those things that make you reach for tobacco.  ? Learn some ways to deal with cravings, like calling a friend or going for a walk. Cravings often pass. ? Avoid situations that put you at greatest risk for using smokeless tobacco. For some people, it is hard to spend time with friends without dipping or chewing. For others, they might skip a coffee break with coworkers who smoke or use smokeless tobacco.  ? Change your daily routine. Take a different route to work, or eat a meal in a different place. · Cut down on stress. Calm yourself or release tension by doing an activity you enjoy, such as reading a book, taking a hot bath, or gardening. · Talk to your doctor or pharmacist about nicotine replacement therapy. You still get nicotine, but you do not use tobacco. Nicotine replacement products help you slowly reduce the amount of nicotine you need. Many of these products are available over the counter. They include nicotine patches, gum, lozenges, and inhalers. · Ask your doctor about bupropion (Wellbutrin) or varenicline (Chantix), which are prescription medicines. They do not contain nicotine. They help you by reducing withdrawal symptoms, such as stress and anxiety. · Get regular exercise. Having healthy habits will help your body move past its craving for nicotine. · Be prepared to keep trying. Most people are not successful the first few times they try to quit. Do not get mad at yourself if you use tobacco again. Make a list of things you learned, and think about when you want to try again, such as next week, next month, or next year. Where can you learn more? Go to https://Fashion Movementsemaj.healthDana Translation. org and sign in to your [employer] account. Enter D852 in the [facility] box to learn more about \"Stopping Smokeless Tobacco Use: Care Instructions. \"     If you do not have an account, please click on the \"Sign Up Now\" link. Current as of: March 12, 2020               Content Version: 12.6  © 3631-6553 [employer], [employer]. Care instructions adapted under license by [facility] ([facility]). If you have questions about a medical condition or this instruction, always ask your healthcare professional. [address] any warranty or liability for your use of this information.

## 2021-03-05 NOTE — PROGRESS NOTES
Return in about 3 days (around 3/5/2021) for off work note for today through friday.     Patient Instructions   Patient seems to demonstrate slow mentation during conversation. High suspect for thyroid disease. Labs ordered. In person appointment in 3 days to discuss results and come up with next injury plan plus evaluate patient's chest pain symptoms with a respiratory exam.              10/20/20 VISIT NOTES  New Patient        believes blood clots have gone from his legs to his heart-     Heart Problem        Pt states that he had pain in his right arm, - x 2 weeks ago- also states that there is a pressure in his chest - believes his lifestyle is attributing to plaque build up which is causing the pain. pt states that this is in his arteries and an EKG will NOT REAL THIS , believes there are heart valve problem like his heart is swollen. - Stress triggers these episode - pt states that he can feel his heart beating faster .

## 2021-03-10 ENCOUNTER — VIRTUAL VISIT (OUTPATIENT)
Dept: FAMILY MEDICINE CLINIC | Age: 36
End: 2021-03-10
Payer: COMMERCIAL

## 2021-03-10 DIAGNOSIS — R79.89 LOW TESTOSTERONE IN MALE: Primary | ICD-10-CM

## 2021-03-10 PROCEDURE — 99214 OFFICE O/P EST MOD 30 MIN: CPT | Performed by: FAMILY MEDICINE

## 2021-03-10 PROCEDURE — G8427 DOCREV CUR MEDS BY ELIG CLIN: HCPCS | Performed by: FAMILY MEDICINE

## 2021-03-10 ASSESSMENT — VISUAL ACUITY: OU: 1

## 2021-03-10 NOTE — PROGRESS NOTES
Diagnosis Orders   1. Low testosterone in male  Testosterone Free and Total Male    Adwoa Haile MD, Urology, [facility]         Orders Placed This Encounter   Procedures    Testosterone Free and Total Male     Standing Status:   Future     Standing Expiration Date:   3/10/2022   Adwoa Haile MD, Urology, [facility]     Referral Priority:   Routine     Referral Type:   Eval and Treat     Referral Reason:   Specialty Services Required     Referred to Provider:   Lukas Alvarez MD     Requested Specialty:   Urology     Number of Visits Requested:   1       Return for Record release for facility patient states gave him a prior diagnosis of rheumatoid arthritis. Patient Instructions       Patient Education        Hypogonadism: Care Instructions  Your Care Instructions     Men who have hypogonadism do not make enough testosterone. This hormone allows men to make sperm and to have normal physical male traits. The condition also is known as testosterone deficiency. It can lead to loss of sex drive, weakness, impotence, infertility, and weakened bones. Many things can cause this condition. Causes include injured testicles, certain medicines, an infection, and aging. Having a long-term health problem such as kidney or liver disease or being obese can cause it. So can surgery or radiation treatment for another health problem. It also can be present at birth. It is most often treated with testosterone hormone. You can get the hormone as a shot or through a patch or gel on the skin. Follow-up care is a key part of your treatment and safety. Be sure to make and go to all appointments, and call your doctor if you are having problems. It's also a good idea to know your test results and keep a list of the medicines you take. How can you care for yourself at home? · Take your medicines exactly as prescribed. Call your doctor if you think you are having a problem with your medicine.  You will get more details on the specific medicines your doctor prescribes. · Follow your treatment plan. If you use testosterone hormones, follow your doctor's instructions. Hormones can help relieve many of the effects of this condition, such as impotence. But it may take weeks or months for your symptoms to improve. · Get plenty of exercise. And make sure to get plenty of calcium and vitamin D in your diet. Eat more dairy foods and green vegetables. They can help keep your bones from getting weak. · If you have a hard time dealing with this condition, talk to your doctor about joining a support group. Talking with others who have the same problems can help you cope. When should you call for help? Call your doctor now or seek immediate medical care if:    · You have headaches.     · You have problems with your vision. Watch closely for changes in your health, and be sure to contact your doctor if:    · You have trouble getting or keeping an erection.     · You have a loss of body hair.     · You feel weak or tired a lot of the time.     · Your breasts are getting larger.     · You do not get better as expected. Where can you learn more? Go to https://Pingify InternationalpeOhanae.Solexant. org and sign in to your [employer] account. Enter 99 [id number] in the Biscotti box to learn more about \"Hypogonadism: Care Instructions. \"     If you do not have an account, please click on the \"Sign Up Now\" link. Current as of: March 31, 2020               Content Version: 12.6  © 4287-5856 [employer] Incorporated. Care instructions adapted under license by [facility] ([facility]). If you have questions about a medical condition or this instruction, always ask your healthcare professional. [place] [address] any warranty or liability for your use of this information. This visit began at 5:05pm    The location for this appointment was the [facility] primary care site.     TELEHEALTH APPOINTMENT  Patient has been screened to determine that this visit qualifies for a \"Video Visit\". This visit was via video due to the restrictions of the COVID-19 pandemic. All issues as below were discussed and addressed but note a limited visually based physical exam was performed. If it was felt the patient should be evaluated in the clinic there will be comment below demonstrating they were directed there. The patient is aware and has given verbal consent to be billed for this video encounter. The resources used for this visit were [employer] for chart access and [employer]. me    Chief Complaint   Patient presents with   [address]     pt would still like to kno9w whats going on with his body       Patient had questions about lab results. Testosterone technically in normal range but on the very low side of normal.  Patient continues to have significant fatigue. He also states he has been diagnosed with rheumatoid arthritis in the past at another facility. PMH:    Current Outpatient Medications on File Prior to Visit   Medication Sig Dispense Refill    omeprazole (PRILOSEC) 20 MG delayed release capsule Take 1 capsule by mouth 2 times daily (before meals) 60 capsule 5    aspirin 325 MG tablet Take 325 mg by mouth daily       No current facility-administered medications on file prior to visit.       Past Medical History:   Diagnosis Date    Mononucleosis     as child     Past Surgical History:   Procedure Laterality Date    WISDOM TOOTH EXTRACTION Left     bottom     Social History     Socioeconomic History    Marital status: Single     Spouse name: Not on file    Number of children: Not on file    Years of education: Not on file    Highest education level: Not on file   Occupational History    Not on file   Social Needs    Financial resource strain: Hard    Food insecurity     Worry: Sometimes true     Inability: Sometimes true    Transportation needs     Medical: No     Non-medical: No   Tobacco Use    Smoking status: Current Every Day Smoker     Packs/day: 1.00     Years: 20.00     Pack years: 20.00     Types: Cigarettes    Smokeless tobacco: Never Used   Substance and Sexual Activity    Alcohol use: Never     Frequency: Never     Comment: rarely    Drug use: Never    Sexual activity: Not on file   Lifestyle    Physical activity     Days per week: Not on file     Minutes per session: Not on file    Stress: Not on file   Relationships    Social connections     Talks on phone: Not on file     Gets together: Not on file     Attends Sabianism service: Not on file     Active member of club or organization: Not on file     Attends meetings of clubs or organizations: Not on file     Relationship status: Not on file    Intimate partner violence     Fear of current or ex partner: Not on file     Emotionally abused: Not on file     Physically abused: Not on file     Forced sexual activity: Not on file   Other Topics Concern    Not on file   Social History Narrative    Not on file     Family History   Problem Relation Age of Onset    No Known Problems Mother     Heart Disease Father      Allergies:  Patient has no known allergies. Review of Systems   Constitutional:        Not done       PHYSICAL EXAM/ RESULTS    (Limited exam: only performed what is available through a visual exam during the video encounter as indicated due to the restrictions of the COVID-19 pandemic)    Patient-Reported Vitals 3/10/2021   Patient-Reported Weight 5 8   Patient-Reported Height 187lbs             Physical Exam  Vitals signs (All exam findings are noted during patient movement during video exam) reviewed. Constitutional:       General: He is not in acute distress. Appearance: Normal appearance. He is well-developed. He is not ill-appearing or diaphoretic. HENT:      Head: Normocephalic and atraumatic. No right periorbital erythema or left periorbital erythema. Hair is normal.      Jaw: No swelling or pain on movement. Right Ear: Hearing and external ear normal.      Left Ear: Hearing and external ear normal.      Ears:      Comments: External ears are normal shape and even level placement on the head     Nose: No nasal deformity. Right Nostril: No epistaxis. Left Nostril: No epistaxis. Mouth/Throat:      Lips: Pink. Comments: Lips have appropriate movement with conversation  Eyes:      General: Lids are normal. Vision grossly intact. Gaze aligned appropriately. No allergic shiner. Right eye: No discharge. Left eye: No discharge. Extraocular Movements: Extraocular movements intact. Conjunctiva/sclera: Conjunctivae normal.      Comments: Pupils equal   Neck:      Musculoskeletal: Normal range of motion. No erythema or pain with movement. Thyroid: No thyromegaly. Trachea: No tracheal deviation. Pulmonary:      Effort: No tachypnea, accessory muscle usage or respiratory distress. Comments: No difficulty with conversation  Musculoskeletal:      Comments: Range of motion of upper and lower extremity large muscle groups are normal during ambulation. Gait normal   Skin:     Coloration: Skin is not cyanotic, jaundiced or pale. Findings: No acne. Comments: No evidence of abnormal hair loss. Facial skin without defect. Neurological:      Mental Status: He is alert and oriented to person, place, and time. Cranial Nerves: No cranial nerve deficit, dysarthria or facial asymmetry. Coordination: Coordination normal.      Gait: Gait is intact. Comments: Motor function intact for gait and range of motion of large muscle groups of extremities   Psychiatric:         Attention and Perception: Attention and perception normal.         Mood and Affect: Mood and affect normal.         Speech: Speech normal.         Behavior: Behavior normal. Behavior is cooperative. Thought Content:  Thought content normal.         Judgment: Judgment normal. Recent Results (from the past 2016 hour(s))   Testosterone Free and Total Male    Collection Time: 03/03/21  1:25 PM   Result Value Ref Range    Testosterone Free, Calc 82 47 - 244 pg/mL    Testosterone % Free 2.3 1.6 - 2.9 %    Total Testosterone 351 300 - 1080 ng/dL    Sex Hormone Binding 19 11 - 80 nmol/L   TSH with Reflex    Collection Time: 03/03/21  1:25 PM   Result Value Ref Range    TSH 1.470 0.440 - 3.860 uIU/mL   Comprehensive Metabolic Panel    Collection Time: 03/03/21  1:25 PM   Result Value Ref Range    Sodium 140 135 - 144 mEq/L    Potassium 3.8 3.4 - 4.9 mEq/L    Chloride 104 95 - 107 mEq/L    CO2 25 20 - 31 mEq/L    Anion Gap 11 9 - 15 mEq/L    Glucose 90 70 - 99 mg/dL    BUN 8 6 - 20 mg/dL    CREATININE 0.91 0.70 - 1.20 mg/dL    GFR Non-African American >60.0 >60    GFR  >60.0 >60    Calcium 9.8 8.5 - 9.9 mg/dL    Total Protein 7.7 6.3 - 8.0 g/dL    Albumin 4.6 3.5 - 4.6 g/dL    Total Bilirubin 0.5 0.2 - 0.7 mg/dL    Alkaline Phosphatase 92 35 - 104 U/L    ALT 15 0 - 41 U/L    AST 16 0 - 40 U/L    Globulin 3.1 2.3 - 3.5 g/dL   CBC Auto Differential    Collection Time: 03/03/21  1:25 PM   Result Value Ref Range    WBC 9.9 4.8 - 10.8 K/uL    RBC 4.74 4.70 - 6.10 M/uL    Hemoglobin 14.2 14.0 - 18.0 g/dL    Hematocrit 42.6 42.0 - 52.0 %    MCV 90.0 80.0 - 100.0 fL    MCH 30.0 27.0 - 31.3 pg    MCHC 33.4 33.0 - 37.0 %    RDW 13.3 11.5 - 14.5 %    Platelets 495 615 - 577 K/uL    Neutrophils % 65.1 %    Lymphocytes % 28.2 %    Monocytes % 4.5 %    Eosinophils % 1.0 %    Basophils % 1.2 %    Neutrophils Absolute 6.4 1.4 - 6.5 K/uL    Lymphocytes Absolute 2.8 1.0 - 4.8 K/uL    Monocytes Absolute 0.4 0.2 - 0.8 K/uL    Eosinophils Absolute 0.1 0.0 - 0.7 K/uL    Basophils Absolute 0.1 0.0 - 0.2 K/uL           Assessment:       Diagnosis Orders   1.  Low testosterone in male  Testosterone Free and Total Male    Cherelle Galindo MD, [facility]rology, [facility]         Orders Placed This Encounter   Procedures    Testosterone Free and Total Male     Standing Status:   Future     Standing Expiration Date:   3/10/2022   Jessenia Steve MD, Urology, [facility]     Referral Priority:   Routine     Referral Type:   Eval and Treat     Referral Reason:   Specialty Services Required     Referred to Provider:   Pepe Garica MD     Requested Specialty:   Urology     Number of Visits Requested:   1         Plan:   Return for Record release for facility patient states gave him a prior diagnosis of rheumatoid arthritis. Patient Instructions       Patient Education        Hypogonadism: Care Instructions  Your Care Instructions     Men who have hypogonadism do not make enough testosterone. This hormone allows men to make sperm and to have normal physical male traits. The condition also is known as testosterone deficiency. It can lead to loss of sex drive, weakness, impotence, infertility, and weakened bones. Many things can cause this condition. Causes include injured testicles, certain medicines, an infection, and aging. Having a long-term health problem such as kidney or liver disease or being obese can cause it. So can surgery or radiation treatment for another health problem. It also can be present at birth. It is most often treated with testosterone hormone. You can get the hormone as a shot or through a patch or gel on the skin. Follow-up care is a key part of your treatment and safety. Be sure to make and go to all appointments, and call your doctor if you are having problems. It's also a good idea to know your test results and keep a list of the medicines you take. How can you care for yourself at home? · Take your medicines exactly as prescribed. Call your doctor if you think you are having a problem with your medicine. You will get more details on the specific medicines your doctor prescribes. · Follow your treatment plan.  If you use testosterone hormones, follow your doctor's instructions. Hormones can help relieve many of the effects of this condition, such as impotence. But it may take weeks or months for your symptoms to improve. · Get plenty of exercise. And make sure to get plenty of calcium and vitamin D in your diet. Eat more dairy foods and green vegetables. They can help keep your bones from getting weak. · If you have a hard time dealing with this condition, talk to your doctor about joining a support group. Talking with others who have the same problems can help you cope. When should you call for help? Call your doctor now or seek immediate medical care if:    · You have headaches.     · You have problems with your vision. Watch closely for changes in your health, and be sure to contact your doctor if:    · You have trouble getting or keeping an erection.     · You have a loss of body hair.     · You feel weak or tired a lot of the time.     · Your breasts are getting larger.     · You do not get better as expected. Where can you learn more? Go to https://BBK Worldwide.Gamersband. org and sign in to your [employer] account. Enter 99 [id number] in the Adyuka box to learn more about \"Hypogonadism: Care Instructions. \"     If you do not have an account, please click on the \"Sign Up Now\" link. Current as of: March 31, 2020               Content Version: 12.6  © 8205-6805 [employer], Incorporated. Care instructions adapted under license by [facility] ([facility]). If you have questions about a medical condition or this instruction, always ask your healthcare professional. [place] [address] any warranty or liability for your use of this information. This visit ended at 5:18pm    The resources used for this visit were [employer] for chart access and doxy. deborah Castro M.D. An  electronic signature was used to authenticate this note.     --Remberto Flores MD on 3/10/2021 at 6:18 PM        Pursuant to the emergency declaration under the [facility], [facility] waiver authority and the [employer] and [employer] Act, this Virtual  Visit was conducted, with patient's consent, to reduce the patient's risk of exposure to COVID-19 and provide continuity of care for an established patient. Services were provided through a video synchronous discussion virtually to substitute for in-person clinic visit.

## 2021-03-10 NOTE — PATIENT INSTRUCTIONS
Patient Education        Hypogonadism: Care Instructions  Your Care Instructions     Men who have hypogonadism do not make enough testosterone. This hormone allows men to make sperm and to have normal physical male traits. The condition also is known as testosterone deficiency. It can lead to loss of sex drive, weakness, impotence, infertility, and weakened bones. Many things can cause this condition. Causes include injured testicles, certain medicines, an infection, and aging. Having a long-term health problem such as kidney or liver disease or being obese can cause it. So can surgery or radiation treatment for another health problem. It also can be present at birth. It is most often treated with testosterone hormone. You can get the hormone as a shot or through a patch or gel on the skin. Follow-up care is a key part of your treatment and safety. Be sure to make and go to all appointments, and call your doctor if you are having problems. It's also a good idea to know your test results and keep a list of the medicines you take. How can you care for yourself at home? · Take your medicines exactly as prescribed. Call your doctor if you think you are having a problem with your medicine. You will get more details on the specific medicines your doctor prescribes. · Follow your treatment plan. If you use testosterone hormones, follow your doctor's instructions. Hormones can help relieve many of the effects of this condition, such as impotence. But it may take weeks or months for your symptoms to improve. · Get plenty of exercise. And make sure to get plenty of calcium and vitamin D in your diet. Eat more dairy foods and green vegetables. They can help keep your bones from getting weak. · If you have a hard time dealing with this condition, talk to your doctor about joining a support group. Talking with others who have the same problems can help you cope. When should you call for help?    Call your doctor now or seek immediate medical care if:    · You have headaches.     · You have problems with your vision. Watch closely for changes in your health, and be sure to contact your doctor if:    · You have trouble getting or keeping an erection.     · You have a loss of body hair.     · You feel weak or tired a lot of the time.     · Your breasts are getting larger.     · You do not get better as expected. Where can you learn more? Go to https://ViralyticspesalenaFlowboardeb.Customer Alliance. org and sign in to your [employer] account. Enter 99 [id number] in the Crowdcube box to learn more about \"Hypogonadism: Care Instructions. \"     If you do not have an account, please click on the \"Sign Up Now\" link. Current as of: March 31, 2020               Content Version: 12.6  © 8794-3147 [employer], Incorporated. Care instructions adapted under license by [facility] ([facility]). If you have questions about a medical condition or this instruction, always ask your healthcare professional. [place] [address] any warranty or liability for your use of this information.

## 2021-03-17 ENCOUNTER — HOSPITAL ENCOUNTER (OUTPATIENT)
Dept: PULMONOLOGY | Age: 36
Discharge: HOME | End: 2021-03-17
Payer: COMMERCIAL

## 2021-03-17 DIAGNOSIS — R07.9 CHEST PAIN, UNSPECIFIED TYPE: ICD-10-CM

## 2021-03-17 DIAGNOSIS — R06.02 SHORTNESS OF BREATH: ICD-10-CM

## 2021-03-17 PROCEDURE — 94726 PLETHYSMOGRAPHY LUNG VOLUMES: CPT

## 2021-03-17 PROCEDURE — 94010 BREATHING CAPACITY TEST: CPT

## 2021-03-17 PROCEDURE — 94726 PLETHYSMOGRAPHY LUNG VOLUMES: CPT | Performed by: INTERNAL MEDICINE

## 2021-03-17 PROCEDURE — 94375 RESPIRATORY FLOW VOLUME LOOP: CPT | Performed by: INTERNAL MEDICINE

## 2021-03-17 PROCEDURE — 94729 DIFFUSING CAPACITY: CPT | Performed by: INTERNAL MEDICINE

## 2021-03-17 PROCEDURE — 94729 DIFFUSING CAPACITY: CPT

## 2021-03-23 NOTE — PROCEDURES
[address]                      [address], [address] [facility]                               PULMONARY FUNCTION    PATIENT NAME: Nidhi Castro                  :        1985  MED REC NO:   [id number]                            ROOM:  ACCOUNT NO:   [de-identified]                           ADMIT DATE: 2021  PROVIDER:     Mehran Abdullahi MD    DATE OF PROCEDURE:  2021    REQUESTING PROVIDER:  Tanesha Graves MD    INTERPRETING PROVIDER:  Chele Ortiz MD    REASON FOR STUDY:  Shortness of breath, cough, and wheezing. INTERPRETATION:  FVC is 4.20, 86% of predicted. FEV1 is 3.59, 90% of  predicted. FEV1/FVC is 85%. FEF 25-75% is 3.94, 100% of predicted. Lung volume study shows residual volume is 1.37, 86% of predicted. TLC  is 5.50, 87% of predicted. Diffusion capacity is 19.75, 72% of  predicted. Airway resistance is 1.30, 89% of predicted. SUMMARY:  Normal spirometry. Static lung volume within normal range. Diffusion capacity is mildly impaired. Airway resistance is normal.   Flow volume loop is not suggestive of obstructive or restrictive  disease. Clinical correlation is requested.         Olita Olszewski, MD    D: 2021 13:06:13       T: 2021 14:03:26     [email or web address]  Job#: [id number]     Doc#: [id number]    CC:

## 2021-03-29 ENCOUNTER — VIRTUAL VISIT (OUTPATIENT)
Dept: FAMILY MEDICINE CLINIC | Age: 36
End: 2021-03-29
Payer: COMMERCIAL

## 2021-03-29 DIAGNOSIS — R06.02 SHORTNESS OF BREATH: Primary | ICD-10-CM

## 2021-03-29 DIAGNOSIS — R79.89 LOW TESTOSTERONE IN MALE: ICD-10-CM

## 2021-03-29 PROCEDURE — 99214 OFFICE O/P EST MOD 30 MIN: CPT | Performed by: FAMILY MEDICINE

## 2021-03-29 PROCEDURE — G8427 DOCREV CUR MEDS BY ELIG CLIN: HCPCS | Performed by: FAMILY MEDICINE

## 2021-03-29 PROCEDURE — 4004F PT TOBACCO SCREEN RCVD TLK: CPT | Performed by: FAMILY MEDICINE

## 2021-03-29 PROCEDURE — G8484 FLU IMMUNIZE NO ADMIN: HCPCS | Performed by: FAMILY MEDICINE

## 2021-03-29 PROCEDURE — G8419 CALC BMI OUT NRM PARAM NOF/U: HCPCS | Performed by: FAMILY MEDICINE

## 2021-03-29 ASSESSMENT — ENCOUNTER SYMPTOMS: SHORTNESS OF BREATH: 1

## 2021-03-29 ASSESSMENT — VISUAL ACUITY: OU: 1

## 2021-03-29 NOTE — PATIENT INSTRUCTIONS
Refer to pulmonology for further evaluation of potential risk factors for respiratory disorders and appropriate work-up. Testosterone therapy can be started if patient decides. Not mandatory.

## 2021-03-29 NOTE — PROGRESS NOTES
Diagnosis Orders   1. Shortness of breath  Francheska Euceda MD, Pulmonology, [facility]   2. Low testosterone in male           Orders Placed This Encounter   Procedures   Francheska Euceda MD, Pulmonology, [facility]     Referral Priority:   Routine     Referral Type:   Eval and Treat     Referral Reason:   Specialty Services Required     Referred to Provider:   Tiara Bryson MD     Requested Specialty:   Pulmonology     Number of Visits Requested:   1       Return if symptoms worsen or fail to improve. Patient Instructions   Refer to pulmonology for further evaluation of potential risk factors for respiratory disorders and appropriate work-up. Testosterone therapy can be started if patient decides. Not mandatory. This visit began at 4:04pm      The location for this appointment was the [facility] primary care site. TELEHEALTH APPOINTMENT  Patient has been screened to determine that this visit qualifies for a \"Video Visit\". This visit was via video due to the restrictions of the COVID-19 pandemic. All issues as below were discussed and addressed but note a limited visually based physical exam was performed. If it was felt the patient should be evaluated in the clinic there will be comment below demonstrating they were directed there. The patient is aware and has given verbal consent to be billed for this video encounter. The resources used for this visit were [email or web address] for chart access and [employer]. me    Chief Complaint   Patient presents with    Results     PFT        Patient continues to express shortness of breath symptoms and chest pain. 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 He is concerned that it may be something else going on despite his PFT being normal.    Patient declines the idea of testosterone therapy he is not comfortable with it.           PMH:    Current Outpatient Medications on File Prior to Visit   Medication Sig Dispense Refill    omeprazole (PRILOSEC) 20 MG delayed release capsule Take 1 capsule by mouth 2 times daily (before meals) 60 capsule 5    aspirin 325 MG tablet Take 325 mg by mouth daily       No current facility-administered medications on file prior to visit.       Past Medical History:   Diagnosis Date    Mononucleosis     as child     Past Surgical History:   Procedure Laterality Date    WISDOM TOOTH EXTRACTION Left     bottom     Social History     Socioeconomic History    Marital status: Single     Spouse name: Not on file    Number of children: Not on file    Years of education: Not on file    Highest education level: Not on file   Occupational History    Not on file   Social Needs    Financial resource strain: Hard    Food insecurity     Worry: Sometimes true     Inability: Sometimes true    Transportation needs     Medical: No     Non-medical: No   Tobacco Use    Smoking status: Current Every Day Smoker     Packs/day: 1.00     Years: 20.00     Pack years: 20.00     Types: Cigarettes    Smokeless tobacco: Never Used   Substance and Sexual Activity    Alcohol use: Never     Frequency: Never     Comment: rarely    Drug use: Never    Sexual activity: Not on file   Lifestyle    Physical activity     Days per week: Not on file     Minutes per session: Not on file    Stress: Not on file   Relationships    Social connections     Talks on phone: Not on file     Gets together: Not on file     Attends Zoroastrian service: Not on file     Active member of club or organization: Not on file     Attends meetings of clubs or organizations: Not on file     Relationship status: Not on file    Intimate partner violence     Fear of current or ex partner: Not on file     Emotionally abused: Not on file     Physically abused: Not on file     Forced sexual activity: Not on file   Other Topics Concern    Not on file   Social History Narrative    Not on file     Family History Comments: Range of motion of upper and lower extremity large muscle groups are normal during ambulation. Gait normal   Skin:     Coloration: Skin is not cyanotic, jaundiced or pale. Findings: No acne. Comments: No evidence of abnormal hair loss. Facial skin without defect. Neurological:      Mental Status: He is alert and oriented to person, place, and time. Cranial Nerves: No cranial nerve deficit, dysarthria or facial asymmetry. Coordination: Coordination normal.      Gait: Gait is intact. Comments: Motor function intact for gait and range of motion of large muscle groups of extremities   Psychiatric:         Attention and Perception: Attention and perception normal.         Mood and Affect: Mood and affect normal.         Speech: Speech normal.         Behavior: Behavior normal. Behavior is cooperative. Thought Content:  Thought content normal.         Judgment: Judgment normal.         Recent Results (from the past 2016 hour(s))   Testosterone Free and Total Male    Collection Time: 03/03/21  1:25 PM   Result Value Ref Range    Testosterone Free, Calc 82 47 - 244 pg/mL    Testosterone % Free 2.3 1.6 - 2.9 %    Total Testosterone 351 300 - 1080 ng/dL    Sex Hormone Binding 19 11 - 80 nmol/L   TSH with Reflex    Collection Time: 03/03/21  1:25 PM   Result Value Ref Range    TSH 1.470 0.440 - 3.860 uIU/mL   Comprehensive Metabolic Panel    Collection Time: 03/03/21  1:25 PM   Result Value Ref Range    Sodium 140 135 - 144 mEq/L    Potassium 3.8 3.4 - 4.9 mEq/L    Chloride 104 95 - 107 mEq/L    CO2 25 20 - 31 mEq/L    Anion Gap 11 9 - 15 mEq/L    Glucose 90 70 - 99 mg/dL    BUN 8 6 - 20 mg/dL    CREATININE 0.91 0.70 - 1.20 mg/dL    GFR Non-African American >60.0 >60    GFR  >60.0 >60    Calcium 9.8 8.5 - 9.9 mg/dL    Total Protein 7.7 6.3 - 8.0 g/dL    Albumin 4.6 3.5 - 4.6 g/dL    Total Bilirubin 0.5 0.2 - 0.7 mg/dL    Alkaline Phosphatase 92 35 - 104 U/L ALT 15 0 - 41 U/L    AST 16 0 - 40 U/L    Globulin 3.1 2.3 - 3.5 g/dL   CBC Auto Differential    Collection Time: 03/03/21  1:25 PM   Result Value Ref Range    WBC 9.9 4.8 - 10.8 K/uL    RBC 4.74 4.70 - 6.10 M/uL    Hemoglobin 14.2 14.0 - 18.0 g/dL    Hematocrit 42.6 42.0 - 52.0 %    MCV 90.0 80.0 - 100.0 fL    MCH 30.0 27.0 - 31.3 pg    MCHC 33.4 33.0 - 37.0 %    RDW 13.3 11.5 - 14.5 %    Platelets 895 207 - 757 K/uL    Neutrophils % 65.1 %    Lymphocytes % 28.2 %    Monocytes % 4.5 %    Eosinophils % 1.0 %    Basophils % 1.2 %    Neutrophils Absolute 6.4 1.4 - 6.5 K/uL    Lymphocytes Absolute 2.8 1.0 - 4.8 K/uL    Monocytes Absolute 0.4 0.2 - 0.8 K/uL    Eosinophils Absolute 0.1 0.0 - 0.7 K/uL    Basophils Absolute 0.1 0.0 - 0.2 K/uL           Assessment:       Diagnosis Orders   1. Shortness of breath  Trudy Villalobos MD, PulmonologyPranay   2. Low testosterone in male           Orders Placed This Encounter   Procedures   Trudy Villalobos MD, Pulmonology, Pr[facility]     Referral Priority:   Routine     Referral Type:   Eval and Treat     Referral Reason:   Specialty Services Required     Referred to Provider:   Isabel Murray MD     Requested Specialty:   Pulmonology     Number of Visits Requested:   1         Plan:   Return if symptoms worsen or fail to improve. Patient Instructions   Refer to pulmonology for further evaluation of potential risk factors for respiratory disorders and appropriate work-up. Testosterone therapy can be started if patient decides. Not mandatory. This visit ended at 4:19pm    The resources used for this visit were [email or web address] for chart access and [email or web address] M.D. An  electronic signature was used to authenticate this note.     --Laurie Mathews MD on 3/29/2021 at 4:18 PM        Pursuant to the emergency declaration under the [facility], [address] [place] [address] authority and the [facility] and

## 2022-04-28 DIAGNOSIS — K21.9 GASTROESOPHAGEAL REFLUX DISEASE WITHOUT ESOPHAGITIS: ICD-10-CM

## 2022-04-28 RX ORDER — OMEPRAZOLE 20 MG/1
CAPSULE, DELAYED RELEASE ORAL
Qty: 60 CAPSULE | Refills: 0 | Status: SHIPPED | OUTPATIENT
Start: 2022-04-28 | End: 2022-07-18

## 2022-04-28 NOTE — TELEPHONE ENCOUNTER
[place], called to schedule a follow up appointment unsure what you woulf wanna do.  Last VV 03/29/2021

## 2024-10-29 ENCOUNTER — APPOINTMENT (OUTPATIENT)
Dept: CT IMAGING | Age: 39
DRG: 750 | End: 2024-10-29
Payer: COMMERCIAL

## 2024-10-29 ENCOUNTER — HOSPITAL ENCOUNTER (INPATIENT)
Age: 39
LOS: 7 days | Discharge: HOME | DRG: 750 | End: 2024-11-05
Attending: EMERGENCY MEDICINE | Admitting: INTERNAL MEDICINE
Payer: COMMERCIAL

## 2024-10-29 DIAGNOSIS — E87.6 HYPOKALEMIA: ICD-10-CM

## 2024-10-29 DIAGNOSIS — F23 ACUTE PSYCHOSIS (HCC): Primary | ICD-10-CM

## 2024-10-29 LAB
ALBUMIN SERPL-MCNC: 4.5 G/DL (ref 3.5–5.2)
ALP SERPL-CCNC: 93 U/L (ref 40–129)
ALT SERPL-CCNC: 38 U/L (ref 10–50)
ANION GAP SERPL CALCULATED.3IONS-SCNC: 10 MMOL/L (ref 9–16)
ANION GAP SERPL CALCULATED.3IONS-SCNC: 14 MMOL/L (ref 9–16)
APAP SERPL-MCNC: <5 UG/ML (ref 10–30)
AST SERPL-CCNC: 81 U/L (ref 10–50)
BASOPHILS # BLD: 0.1 K/UL (ref 0–0.2)
BASOPHILS NFR BLD: 1 % (ref 0–2)
BILIRUB SERPL-MCNC: 0.5 MG/DL (ref 0–1.2)
BUN SERPL-MCNC: 10 MG/DL (ref 6–20)
BUN SERPL-MCNC: 7 MG/DL (ref 6–20)
CALCIUM SERPL-MCNC: 9.1 MG/DL (ref 8.6–10.4)
CALCIUM SERPL-MCNC: 9.4 MG/DL (ref 8.6–10.4)
CHLORIDE SERPL-SCNC: 99 MMOL/L (ref 98–107)
CHLORIDE SERPL-SCNC: 99 MMOL/L (ref 98–107)
CO2 SERPL-SCNC: 27 MMOL/L (ref 20–31)
CO2 SERPL-SCNC: 30 MMOL/L (ref 20–31)
CREAT SERPL-MCNC: 1.2 MG/DL (ref 0.7–1.2)
CREAT SERPL-MCNC: 1.3 MG/DL (ref 0.7–1.2)
EOSINOPHIL # BLD: 0.4 K/UL (ref 0–0.4)
EOSINOPHILS RELATIVE PERCENT: 3 % (ref 0–4)
ERYTHROCYTE [DISTWIDTH] IN BLOOD BY AUTOMATED COUNT: 14 % (ref 11.5–14.9)
ETHANOL PERCENT: NORMAL %
ETHANOLAMINE SERPL-MCNC: <10 MG/DL (ref 0–0.08)
GFR, ESTIMATED: 71 ML/MIN/1.73M2
GFR, ESTIMATED: 78 ML/MIN/1.73M2
GLUCOSE SERPL-MCNC: 111 MG/DL (ref 74–99)
GLUCOSE SERPL-MCNC: 117 MG/DL (ref 74–99)
HCT VFR BLD AUTO: 39.4 % (ref 41–53)
HGB BLD-MCNC: 13.6 G/DL (ref 13.5–17.5)
LYMPHOCYTES NFR BLD: 3.1 K/UL (ref 1–4.8)
LYMPHOCYTES RELATIVE PERCENT: 21 % (ref 24–44)
MAGNESIUM SERPL-MCNC: 2.1 MG/DL (ref 1.6–2.6)
MCH RBC QN AUTO: 30.9 PG (ref 26–34)
MCHC RBC AUTO-ENTMCNC: 34.6 G/DL (ref 31–37)
MCV RBC AUTO: 89.5 FL (ref 80–100)
MONOCYTES NFR BLD: 1.4 K/UL (ref 0.1–1.3)
MONOCYTES NFR BLD: 9 % (ref 1–7)
NEUTROPHILS NFR BLD: 66 % (ref 36–66)
NEUTS SEG NFR BLD: 10.3 K/UL (ref 1.3–9.1)
PLATELET # BLD AUTO: 155 K/UL (ref 150–450)
PMV BLD AUTO: 12.7 FL (ref 6–12)
POTASSIUM SERPL-SCNC: 2.8 MMOL/L (ref 3.7–5.3)
POTASSIUM SERPL-SCNC: 3.1 MMOL/L (ref 3.7–5.3)
PROT SERPL-MCNC: 7.7 G/DL (ref 6.6–8.7)
RBC # BLD AUTO: 4.4 M/UL (ref 4.5–5.9)
SALICYLATES SERPL-MCNC: <1 MG/DL (ref 0–10)
SODIUM SERPL-SCNC: 139 MMOL/L (ref 136–145)
SODIUM SERPL-SCNC: 140 MMOL/L (ref 136–145)
WBC OTHER # BLD: 15.3 K/UL (ref 3.5–11)

## 2024-10-29 PROCEDURE — G0480 DRUG TEST DEF 1-7 CLASSES: HCPCS

## 2024-10-29 PROCEDURE — 80048 BASIC METABOLIC PNL TOTAL CA: CPT

## 2024-10-29 PROCEDURE — 80053 COMPREHEN METABOLIC PANEL: CPT

## 2024-10-29 PROCEDURE — 6370000000 HC RX 637 (ALT 250 FOR IP): Performed by: EMERGENCY MEDICINE

## 2024-10-29 PROCEDURE — 83735 ASSAY OF MAGNESIUM: CPT

## 2024-10-29 PROCEDURE — 99285 EMERGENCY DEPT VISIT HI MDM: CPT

## 2024-10-29 PROCEDURE — 70450 CT HEAD/BRAIN W/O DYE: CPT

## 2024-10-29 PROCEDURE — 1240000000 HC EMOTIONAL WELLNESS R&B

## 2024-10-29 PROCEDURE — 36415 COLL VENOUS BLD VENIPUNCTURE: CPT

## 2024-10-29 PROCEDURE — 85025 COMPLETE CBC W/AUTO DIFF WBC: CPT

## 2024-10-29 PROCEDURE — 80179 DRUG ASSAY SALICYLATE: CPT

## 2024-10-29 PROCEDURE — 83036 HEMOGLOBIN GLYCOSYLATED A1C: CPT

## 2024-10-29 PROCEDURE — 80143 DRUG ASSAY ACETAMINOPHEN: CPT

## 2024-10-29 RX ORDER — POTASSIUM CHLORIDE 1500 MG/1
20 TABLET, EXTENDED RELEASE ORAL
Status: DISPENSED | OUTPATIENT
Start: 2024-10-30 | End: 2024-11-04

## 2024-10-29 RX ORDER — POTASSIUM CHLORIDE 7.45 MG/ML
10 INJECTION INTRAVENOUS
Status: DISPENSED | OUTPATIENT
Start: 2024-10-29 | End: 2024-10-29

## 2024-10-29 RX ORDER — DIPHENHYDRAMINE HYDROCHLORIDE 50 MG/ML
50 INJECTION INTRAMUSCULAR; INTRAVENOUS EVERY 4 HOURS PRN
Status: DISCONTINUED | OUTPATIENT
Start: 2024-10-29 | End: 2024-11-05 | Stop reason: HOSPADM

## 2024-10-29 RX ORDER — LANOLIN ALCOHOL/MO/W.PET/CERES
3 CREAM (GRAM) TOPICAL NIGHTLY PRN
Status: ON HOLD | COMMUNITY
End: 2024-11-05 | Stop reason: HOSPADM

## 2024-10-29 RX ORDER — HALOPERIDOL 5 MG/ML
5 INJECTION INTRAMUSCULAR EVERY 4 HOURS PRN
Status: DISCONTINUED | OUTPATIENT
Start: 2024-10-29 | End: 2024-11-05 | Stop reason: HOSPADM

## 2024-10-29 RX ORDER — LORAZEPAM 1 MG/1
2 TABLET ORAL EVERY 4 HOURS PRN
Status: DISCONTINUED | OUTPATIENT
Start: 2024-10-29 | End: 2024-11-05 | Stop reason: HOSPADM

## 2024-10-29 RX ORDER — HALOPERIDOL 5 MG/1
5 TABLET ORAL EVERY 4 HOURS PRN
Status: DISCONTINUED | OUTPATIENT
Start: 2024-10-29 | End: 2024-11-05 | Stop reason: HOSPADM

## 2024-10-29 RX ORDER — LORAZEPAM 2 MG/ML
2 INJECTION INTRAMUSCULAR EVERY 4 HOURS PRN
Status: DISCONTINUED | OUTPATIENT
Start: 2024-10-29 | End: 2024-11-05 | Stop reason: HOSPADM

## 2024-10-29 RX ADMIN — POTASSIUM BICARBONATE 60 MEQ: 782 TABLET, EFFERVESCENT ORAL at 17:06

## 2024-10-29 ASSESSMENT — PATIENT HEALTH QUESTIONNAIRE - PHQ9
SUM OF ALL RESPONSES TO PHQ QUESTIONS 1-9: 2
1. LITTLE INTEREST OR PLEASURE IN DOING THINGS: SEVERAL DAYS
SUM OF ALL RESPONSES TO PHQ9 QUESTIONS 1 & 2: 2
2. FEELING DOWN, DEPRESSED OR HOPELESS: SEVERAL DAYS
SUM OF ALL RESPONSES TO PHQ QUESTIONS 1-9: 2

## 2024-10-29 ASSESSMENT — LIFESTYLE VARIABLES
HOW MANY STANDARD DRINKS CONTAINING ALCOHOL DO YOU HAVE ON A TYPICAL DAY: PATIENT DOES NOT DRINK
HOW OFTEN DO YOU HAVE A DRINK CONTAINING ALCOHOL: NEVER

## 2024-10-29 ASSESSMENT — SLEEP AND FATIGUE QUESTIONNAIRES
SLEEP PATTERN: DISTURBED/INTERRUPTED SLEEP
DO YOU USE A SLEEP AID: YES
DO YOU HAVE DIFFICULTY SLEEPING: YES
AVERAGE NUMBER OF SLEEP HOURS: 3

## 2024-10-29 ASSESSMENT — PAIN - FUNCTIONAL ASSESSMENT: PAIN_FUNCTIONAL_ASSESSMENT: NONE - DENIES PAIN

## 2024-10-29 NOTE — ED NOTES
Report given to SUELLEN Douglas from [facility].   Report method in person   The following was reviewed with receiving RN:   Current vital signs:  /84   Pulse 85   Temp 98.7 °F (37.1 °C) (Oral)   Resp 18   SpO2 97%                MEWS Score: 1     Any medication or safety alerts were reviewed. Any pending diagnostics and notifications were also reviewed, as well as any safety concerns or issues, abnormal labs, abnormal imaging, and abnormal assessment findings. Questions were answered.

## 2024-10-29 NOTE — ED PROVIDER NOTES
EMERGENCY DEPARTMENT ENCOUNTER    Pt Name: Tino Sanchez  MRN: [id number]  Birthdate 1985  Date of evaluation: 10/29/24  CHIEF COMPLAINT       Chief Complaint   Patient presents with    Mental Health Problem     HISTORY OF PRESENT ILLNESS   39-year-old male presents for mental health evaluation.  Patient was brought in by dad.  Reportedly dad has not seen him in the last few years and he recently showed up at his house talking nonsensically.  Dad brought him for evaluation.  Patient states he has been living in his car.  He denies current suicidal or homicidal ideation, he denies visual or auditory hallucinations when asked about drug or alcohol use he states yes and when asked to elaborate he states he uses \"air and water\"    The history is provided by the patient.           REVIEW OF SYSTEMS     Review of Systems   Constitutional:  Negative for chills and fever.   HENT:  Negative for congestion and ear pain.    Eyes:  Negative for pain.   Respiratory:  Negative for shortness of breath.    Cardiovascular:  Negative for chest pain, palpitations and leg swelling.   Gastrointestinal:  Negative for abdominal pain.   Genitourinary:  Negative for dysuria and flank pain.   Musculoskeletal:  Negative for back pain.   Skin:  Negative for color change.   Neurological:  Negative for numbness and headaches.   Psychiatric/Behavioral:  Negative for confusion.    All other systems reviewed and are negative.    PASTMEDICAL HISTORY     Past Medical History:   Diagnosis Date    Mononucleosis     as child     Past Problem List  Patient Active Problem List   Diagnosis Code    Nicotine dependence, cigarettes, uncomplicated F17.210    Chest pain R07.9     SURGICAL HISTORY       Past Surgical History:   Procedure Laterality Date    WISDOM TOOTH EXTRACTION Left     bottom     CURRENT MEDICATIONS       Previous Medications    MELATONIN 3 MG TABS TABLET    Take 1 tablet by mouth nightly as needed (sleep)     ALLERGIES     has No Known

## 2024-10-29 NOTE — ED NOTES
Pt refuse IV and iv replacement of potassium. Writer notified md and charge nurse. Pt educated on risk of low potassium. Pt agreeable to oral meds

## 2024-10-29 NOTE — ED NOTES
Patient laying on the ground in the PPU sleeping. Patient refused to give a UA multiple times, giving nonsensical reasons as to why.

## 2024-10-29 NOTE — ED NOTES
Clinical Summary:    Tino Sanchez is a 39 y.o. male who presents to the ED by his father for a mental health evaluation. Per father patient showed up at his house after not seeing him for a significant amount of time, and patient was not making any sense with constant word salad.  Upon arrival patient is not making sense and is often responding to internal stimuli. Patient is guarded and defensive with staff. Writer is unable to have any meaningful conversation. Patient is delusional. Patient stating his shoes are radioactive. Patient stating staff is not allowing him to get oxygen. Patient rambling and talking to himself and things that are not there. Patient is tangential and has loose associations.    Patients father states prior to COVID-19 he was staying with him but was kicked out \"because he was lazy and not doing anything.\"   Patient was previously seen years ago for similar complaints but was not psychiatrically hospitalized. Patients father states he believes patient has been sleeping out of his car and a motel.      Patient is a poor historian and not able to tell staff if he has had any past mental health treatment or medications Patient stating he has been walking for 16 hours straight. Patient stating he needs to go to [facility] because he needs to talk to \"[facility]\" but wouldn't tell writer why. Patient then states he believes someone helped him \"destroy\" his \"car which is evidence.\".       Level of Care Disposition:    Patient will need a psychiatry consult once medically cleared.

## 2024-10-30 PROCEDURE — 1240000000 HC EMOTIONAL WELLNESS R&B

## 2024-10-30 PROCEDURE — 6370000000 HC RX 637 (ALT 250 FOR IP): Performed by: PSYCHIATRY & NEUROLOGY

## 2024-10-30 PROCEDURE — APPSS60 APP SPLIT SHARED TIME 46-60 MINUTES: Performed by: NURSE PRACTITIONER

## 2024-10-30 PROCEDURE — 99222 1ST HOSP IP/OBS MODERATE 55: CPT | Performed by: PSYCHIATRY & NEUROLOGY

## 2024-10-30 RX ORDER — POLYETHYLENE GLYCOL 3350 17 G/17G
17 POWDER, FOR SOLUTION ORAL DAILY PRN
Status: DISCONTINUED | OUTPATIENT
Start: 2024-10-30 | End: 2024-11-05 | Stop reason: HOSPADM

## 2024-10-30 RX ORDER — TRAZODONE HYDROCHLORIDE 50 MG/1
50 TABLET, FILM COATED ORAL NIGHTLY PRN
Status: DISCONTINUED | OUTPATIENT
Start: 2024-10-30 | End: 2024-11-05 | Stop reason: HOSPADM

## 2024-10-30 RX ORDER — IBUPROFEN 400 MG/1
400 TABLET, FILM COATED ORAL EVERY 6 HOURS PRN
Status: DISCONTINUED | OUTPATIENT
Start: 2024-10-30 | End: 2024-11-05 | Stop reason: HOSPADM

## 2024-10-30 RX ORDER — POLYETHYLENE GLYCOL 3350 17 G
2 POWDER IN PACKET (EA) ORAL
Status: DISCONTINUED | OUTPATIENT
Start: 2024-10-30 | End: 2024-11-01

## 2024-10-30 RX ORDER — HYDROXYZINE HYDROCHLORIDE 50 MG/1
50 TABLET, FILM COATED ORAL 3 TIMES DAILY PRN
Status: DISCONTINUED | OUTPATIENT
Start: 2024-10-30 | End: 2024-11-05 | Stop reason: HOSPADM

## 2024-10-30 RX ORDER — HALOPERIDOL 5 MG/1
5 TABLET ORAL EVERY 6 HOURS PRN
Status: DISCONTINUED | OUTPATIENT
Start: 2024-10-30 | End: 2024-10-30

## 2024-10-30 RX ORDER — RISPERIDONE 1 MG/1
0.5 TABLET ORAL 2 TIMES DAILY
Status: DISCONTINUED | OUTPATIENT
Start: 2024-10-30 | End: 2024-10-31

## 2024-10-30 RX ORDER — MAGNESIUM HYDROXIDE/ALUMINUM HYDROXICE/SIMETHICONE 120; 1200; 1200 MG/30ML; MG/30ML; MG/30ML
30 SUSPENSION ORAL EVERY 6 HOURS PRN
Status: DISCONTINUED | OUTPATIENT
Start: 2024-10-30 | End: 2024-11-05 | Stop reason: HOSPADM

## 2024-10-30 RX ORDER — ACETAMINOPHEN 325 MG/1
650 TABLET ORAL EVERY 6 HOURS PRN
Status: DISCONTINUED | OUTPATIENT
Start: 2024-10-30 | End: 2024-11-05 | Stop reason: HOSPADM

## 2024-10-30 RX ADMIN — RISPERIDONE 0.5 MG: 1 TABLET, FILM COATED ORAL at 21:07

## 2024-10-30 ASSESSMENT — LIFESTYLE VARIABLES
HOW OFTEN DO YOU HAVE A DRINK CONTAINING ALCOHOL: PATIENT DECLINED
HOW MANY STANDARD DRINKS CONTAINING ALCOHOL DO YOU HAVE ON A TYPICAL DAY: PATIENT DECLINED

## 2024-10-30 NOTE — ED NOTES
This writer consulted with Dr Jones who recommended inpatient hospitalization for safety and stabilization.  Patient placed on application for emergency admission to [facility].

## 2024-10-30 NOTE — ED NOTES
Writer explained the form Rights of an Involuntarily Detained Person to patient.  Patient refused to sign Rights of an Involuntarily Detained Person form. Writer and other staff member present for refusal and form completed and placed in medical records box.

## 2024-10-30 NOTE — GROUP NOTE
Group Therapy Note    Date: 10/30/2024    Group Start Time: 1005  Group End Time: 1014  Group Topic: Psychoeducation    Diana Chamorro, ALEXANDRUW        Group Therapy Note    Attendees: 1/9       patient refused to attend psychoeducation group at 1005a after encouragement from staff.  1:1 talk time provided as alternative to group session

## 2024-10-30 NOTE — H&P
IN-PATIENT SERVICE  [facility] Internal Medicine    CONSULTATION / HISTORY AND PHYSICAL EXAMINATION            Date:   10/30/2024  Patient name:  Tino Sanchez  Date of admission:  10/29/2024  3:01 PM  MRN:   [id number]  Account:  [id number]  YOB: 1985  PCP:    Roel Major MD  Room:   [address] [place] [address]  Code Status:    No Order    Physician Requesting Consult: Edward Reese MD    Reason for Consult:  medical management    Chief Complaint:     Chief Complaint   Patient presents with    Mental Health Problem       History Obtained From:     Patient medical record nursing staff    History of Present Illness:   Patient unfortunately is very poor historian, history is mainly retrieved from EHR  Patient, has past medical history of GERD, hypokalemia, he was brought to emergency room by his father, he approaches father at home unannounced, and he was not making any sense, he had disorganized thought process, patient denying suicidal ideation  Patient was brought to emergency room admitted to [facility] floor with diagnosis acute psychosis  Never diagnosed with chronic medical condition like diabetes, hypertension, coronary artery disease  No complaints of chest pain, shortness of breath    Past Medical History:     Past Medical History:   Diagnosis Date    Mononucleosis     as child        Past Surgical History:     Past Surgical History:   Procedure Laterality Date    WISDOM TOOTH EXTRACTION Left     bottom        Medications Prior to Admission:     Prior to Admission medications    Medication Sig Start Date End Date Taking? Authorizing Provider   melatonin 3 MG TABS tablet Take 1 tablet by mouth nightly as needed (sleep)   Yes Provider, MD Jia        Allergies:     Patient has no known allergies.    Social History:     Tobacco:    reports that he has been smoking cigarettes. He has a 20 pack-year smoking history. He has never used smokeless tobacco.  Alcohol:      reports no history

## 2024-10-30 NOTE — CARE COORDINATION
[facility] Biopsychosocial Assessment    Current Level of Psychosocial Functioning     Independent   Dependent    Minimal Assist     Psychosocial High Risk Factors (check all that apply)    Unable to obtain meds   Chronic illness/pain    Substance abuse   Lack of Family Support   Financial stress   Isolation   Inadequate Community Resources  Suicide attempt(s)  Not taking medications   Victim of crime   Developmental Delay  Unable to manage personal needs    Age 65 or older   Homeless  No transportation   Readmission within 30 days  Unemployment  Traumatic Event    Psychiatric Advanced Directives: N/A    Family to Involve in Treatment: Pot. Dad    Sexual Orientation:  N/A    Patient Strengths: DAV; Patient declined assessment    Patient Barriers: Psychosis, Lack of housing, Not linked to [facility]    Opiate Education Provided:  Denies    [facility]/mental health history: No inpatient history. Not currently linked.    Plan of Care   medication management, group/individual therapies, family meetings, psycho -education, treatment team meetings to assist with stabilization    Initial Discharge Plan:  TBD; Declined assessment    Clinical Summary:    Tino is a 40yo admitted to the [facility] for acute psychosis. He declined assessment at this time by rolling over in bed to face the while versus the writer as well as not acknowledging any questions asked.    Tino does not appear to have inpatient treatment history. It does not appear that he is linked to a [facility].  His opiate and substance use history is unknown; Will provided [facility] treatment resources if requested.  Any past or current legal concerns are unknown.  Any past or current abuse concerns are unknown.    Per chart, Tino's father believes he is living out of his car at this time. It also appears that he has not been in contact with his family in several years and may lack a support system.    Tino's plan is unknown at this time due to declining assessment.  Social Work to provide

## 2024-10-30 NOTE — GROUP NOTE
Group Therapy Note    Date: 10/30/2024    Group Start Time: 1340  Group End Time: 1445  Group Topic: Music Therapy    [facility] Etienne Torres    Music Therapy Group Note        Date: 10/30/2024   Start Time: 1340  End Time: 1445      Number of Participants in Group & Unit Census:  4/7    Topic: Patients shared music to dedicate to important people from their lives. Answered questions about these people and relationships as asked by this writer. Patients offered a variety of opportunities to engage in art and games as well while listening and sharing music.     Goal of Group:Patient goals to reflect on relationships; Increase sense of community; Increase socialization; Demonstrate positive use of time.      Comments:     Patient did not participate in Music Therapy group, despite staff encouragement and explanation of benefits.  Patient remain seclusive to self.  Q15 minute safety checks maintained for patient safety and will continue to encourage patient to attend unit programming.       Signature:  Etienne Barbosa

## 2024-10-30 NOTE — H&P
Department of Psychiatry  Attending Physician Psychiatric Assessment   Patient: Tino Sanchez  MRN: [id number]  Reason for Admission to Psychiatric Unit:  Acute disordered/bizarre behavior or psychomotor agitation or retardation;interferes with ADLs so that patient cannot function at a less intensive care level of care during evaluation and treatment   A mental disorder causing major disability in social, interpersonal, occupational, and/or educational functioning that is leading to dangerous or life-threatening functioning, and that can only be addressed in an acute inpatient setting   A mental disorder that causes an inability to maintain adequate nurtrition or self-care, and family/community support cannot provide reliable, essential care, so that the patient cannont function at a less intensive level of care during evaluation and treatment   Concerns about patient's safety in the community  Past Mental Health Diagnosis: no prior history of mental health diagnoses -unknown  Triggering event or precipitating factor: Housing instability and ... unknown  Length of time/duration of triggering event: Days  Legal Status: Involuntary    CHIEF COMPLAINT: Acute psychosis    History obtained from: Patient, electronic medical record          HISTORY OF PRESENT ILLNESS:    Tino Sanchez is a 39 y.o. male who has a past medical history of GERD, hypokalemia, and low testosterone,. Patient presented to the ED accompanied by his father.  Patient's father reported he showed up and father's home unannounced after not seeing him for several years.  Father reports patient was disoriented, nonsensical in speech, exhibited disorganized thought process, and responding to internal stimuli.  Patient has been homeless and sleeping in his car.  Patient presented with delusional statements stating that his shoes are radioactive and that hospital staff is stealing his oxygen.  He was a poor historian and exhibited word salad.  Patient

## 2024-10-31 LAB
EST. AVERAGE GLUCOSE BLD GHB EST-MCNC: 114 MG/DL
HBA1C MFR BLD: 5.6 % (ref 4–6)

## 2024-10-31 PROCEDURE — 99232 SBSQ HOSP IP/OBS MODERATE 35: CPT | Performed by: PSYCHIATRY & NEUROLOGY

## 2024-10-31 PROCEDURE — 1240000000 HC EMOTIONAL WELLNESS R&B

## 2024-10-31 PROCEDURE — APPSS30 APP SPLIT SHARED TIME 16-30 MINUTES: Performed by: NURSE PRACTITIONER

## 2024-10-31 PROCEDURE — 99222 1ST HOSP IP/OBS MODERATE 55: CPT | Performed by: INTERNAL MEDICINE

## 2024-10-31 PROCEDURE — 6370000000 HC RX 637 (ALT 250 FOR IP): Performed by: EMERGENCY MEDICINE

## 2024-10-31 PROCEDURE — 6370000000 HC RX 637 (ALT 250 FOR IP): Performed by: PSYCHIATRY & NEUROLOGY

## 2024-10-31 RX ORDER — RISPERIDONE 1 MG/1
1 TABLET ORAL 2 TIMES DAILY
Status: DISCONTINUED | OUTPATIENT
Start: 2024-10-31 | End: 2024-11-05 | Stop reason: HOSPADM

## 2024-10-31 RX ORDER — RISPERIDONE 1 MG/1
1 TABLET ORAL ONCE
Status: COMPLETED | OUTPATIENT
Start: 2024-10-31 | End: 2024-10-31

## 2024-10-31 RX ADMIN — RISPERIDONE 1 MG: 1 TABLET, FILM COATED ORAL at 21:09

## 2024-10-31 RX ADMIN — POTASSIUM CHLORIDE 20 MEQ: 1500 TABLET, EXTENDED RELEASE ORAL at 15:28

## 2024-10-31 RX ADMIN — RISPERIDONE 1 MG: 1 TABLET, FILM COATED ORAL at 15:28

## 2024-10-31 NOTE — GROUP NOTE
Group Therapy Note    Date: 10/31/2024    Group Start Time: 1000  Group End Time: 1046  Group Topic: Psychotherapy    Stella Guzman MSW, GLADYS        Group Therapy Note    Attendees: 4/9     Patient refused to attend psychotherapy group at 10:00 am after encouragement from staff. 1:1 talk was offered as alternative to group; patient declined.        Discipline Responsible: /Counselor      Signature:  CINTIA Haas, GLADYS

## 2024-10-31 NOTE — PLAN OF CARE
Problem: Psychosis  Goal: Will report no hallucinations or delusions  Description: INTERVENTIONS:  1. Administer medication as  ordered  2. Assist with reality testing to support increasing orientation  3. Assess if patient's hallucinations or delusions are encouraging self harm or harm to others and intervene as appropriate  Outcome: Progressing    Pt isolates to room, often found asleep. Pt is uncooperative and not interactive, ignoring attempts to perform vitals signs and assessments. Appears sad and avoids gaze. Pt was agreeable to take scheduled medication after briefly asking \"what is that for?\" Took medication after writer provided explanation. Pt ate evening snack and hygiene was strongly encouraged as pt presents disheveled and malodorous. Unable to assess if pt is experiencing any delusions or hallucinations.

## 2024-10-31 NOTE — GROUP NOTE
Group Therapy Note    Date: 10/31/2024    Group Start Time: 1100  Group End Time: 1145  Group Topic: Psychoeducation    [facility]    Pao Baker CTRS    Group Therapy Note    Attendees: 4/9     Patient's Goal:  Patient will identify benefits of creative expression for coping and stress management    Notes:  Patient attended group and participated    Status After Intervention:  Improved    Participation Level: Active Listener and Interactive    Participation Quality: Appropriate, Attentive      Speech:  normal      Thought Process/Content: Logical  Linear      Affective Functioning: Constricted/Restricted      Mood: Anxious      Level of consciousness:  Alert, Oriented x4      Response to Learning: Able to verbalize current knowledge/experience, Able to verbalize/acknowledge new learning      Endings: None Reported    Modes of Intervention: Education, Support, Socialization, and Exploration      Discipline Responsible: Psychoeducational Specialist      Signature:  MAGALI Mercedes

## 2024-10-31 NOTE — GROUP NOTE
Psych-Ed/Relapse Prevention Group Note        Date: October 31, 2024 Start Time: 1:30pm  End Time:  2:15pm      Number of Participants in Group & Unit Census:  2/7    Topic: Leisure skills    Goal of Group:Patient will identify benefits of leisure for coping and stress management      Comments:     Patient did not participate in Psych-Ed/Relapse Prevention group, despite staff encouragement and explanation of benefits.  Patient remain seclusive to self.  Q15 minute safety checks maintained for patient safety and will continue to encourage patient to attend unit programming.         Signature:  Pao Baker, CTRS

## 2024-10-31 NOTE — GROUP NOTE
Group Refusal Note:     Patient refused to attend goals group even after encouragement from staff.  will continue to monitor and support.

## 2024-11-01 PROCEDURE — 6370000000 HC RX 637 (ALT 250 FOR IP)

## 2024-11-01 PROCEDURE — 1240000000 HC EMOTIONAL WELLNESS R&B

## 2024-11-01 PROCEDURE — APPSS30 APP SPLIT SHARED TIME 16-30 MINUTES

## 2024-11-01 PROCEDURE — 6370000000 HC RX 637 (ALT 250 FOR IP): Performed by: PSYCHIATRY & NEUROLOGY

## 2024-11-01 PROCEDURE — 99232 SBSQ HOSP IP/OBS MODERATE 35: CPT | Performed by: PSYCHIATRY & NEUROLOGY

## 2024-11-01 PROCEDURE — 6370000000 HC RX 637 (ALT 250 FOR IP): Performed by: EMERGENCY MEDICINE

## 2024-11-01 RX ORDER — NICOTINE 21 MG/24HR
1 PATCH, TRANSDERMAL 24 HOURS TRANSDERMAL DAILY
Status: DISCONTINUED | OUTPATIENT
Start: 2024-11-01 | End: 2024-11-05 | Stop reason: HOSPADM

## 2024-11-01 RX ADMIN — POTASSIUM CHLORIDE 20 MEQ: 1500 TABLET, EXTENDED RELEASE ORAL at 08:10

## 2024-11-01 RX ADMIN — TRAZODONE HYDROCHLORIDE 50 MG: 50 TABLET ORAL at 21:23

## 2024-11-01 RX ADMIN — RISPERIDONE 1 MG: 1 TABLET, FILM COATED ORAL at 08:10

## 2024-11-01 RX ADMIN — NICOTINE POLACRILEX 2 MG: 2 LOZENGE ORAL at 08:14

## 2024-11-01 RX ADMIN — RISPERIDONE 1 MG: 1 TABLET, FILM COATED ORAL at 21:23

## 2024-11-01 RX ADMIN — HYDROXYZINE HYDROCHLORIDE 50 MG: 50 TABLET, FILM COATED ORAL at 21:23

## 2024-11-01 NOTE — GROUP NOTE
Group Therapy Note    Date: 11/1/2024    Group Start Time: 0900  Group End Time: 0920  Group Topic: Community Meeting    Kristin Glaser    Morning Group Note    Attendees: 5/7       Comments:     Patient did not participate in Morning Goals Group, despite staff encouragement and explanation of benefits.  Patient remains seclusive to self.  Q15 minute safety checks maintained for patient safety and will continue to encourage patient to attend unit programming.

## 2024-11-01 NOTE — PLAN OF CARE
Problem: Risk for Elopement  Goal: Patient will not exit the unit/facility without proper excort  Outcome: Progressing     Problem: Sheila  Goal: Will exhibit normal sleep and speech and no impulsivity  Description: INTERVENTIONS:  1. Administer medication as ordered  2. Set limits on impulsive behavior  3. Make attempts to decrease external stimuli as possible  Outcome: Progressing     Problem: Psychosis  Goal: Will report no hallucinations or delusions  Description: INTERVENTIONS:  1. Administer medication as  ordered  2. Assist with reality testing to support increasing orientation  3. Assess if patient's hallucinations or delusions are encouraging self harm or harm to others and intervene as appropriate  Outcome: Progressing   Patient currently denies thoughts of self harm or suicidal ideation.is not interactive, isolated to room, Patient has slept well . Is eating okay, Nurse is promoting eating, waking patient up for meals. patient attends no groups. Mood is withdrawn, isoltative. Safety checks continue every 15 minutes. Patient has remained safe. Will continue to support and monitor.

## 2024-11-01 NOTE — GROUP NOTE
Psych-Ed/Relapse Prevention Group Note        Date: November 1, 2024 Start Time: 1:30pm  End Time:  2:00pm      Number of Participants in Group & Unit Census:  0/8    Topic: Leisure skills    Goal of Group:Patient will identify benefits of leisure for coping      Comments:     Patient did not participate in Psych-Ed/Relapse Prevention group, despite staff encouragement and explanation of benefits.  Patient remain seclusive to self.  Q15 minute safety checks maintained for patient safety and will continue to encourage patient to attend unit programming.         Signature:  Pao Baker CTRS

## 2024-11-01 NOTE — PLAN OF CARE
[facility]  Day 3 Interdisciplinary Treatment Plan NOTE    Review Date & Time: 0900 11/1/24    Admission Type:   Admission Type: Involuntary    Reason for admission:  Reason for Admission: Involuntary patient from [facility] arrived not making any sense, patient making delusional statements such as \"his shoes are radioactive.\" Patient also stated \"staff is not letting him get any oxygen.\" Patient engaging in self talk and responding to internal stimuli. Patient unable to care for self in community.  Estimated Length of Stay:  5-7 days  Estimated Discharge Date Update:   to be determined by physician    PATIENT STRENGTHS:  Patient Strengths:   Patient Strengths and Limitations:   Addictive Behavior:Addictive Behavior  In the Past 3 Months, Have You Felt or Has Someone Told You That You Have a Problem With  :  (DAV; declined assessment)  Medical Problems:  Past Medical History:   Diagnosis Date    Mononucleosis     as child       Risk:  Fall Risk   Danny Scale Danny Scale Score: 21  BVC    Change in scores:  No Changes to plan of Care:  No    Status EXAM:   Mental Status and Behavioral Exam  Normal: No (withdrawn non interactive.)  Level of Assistance: Independent/Self  Facial Expression: Avoids Gaze, Expressionless  Affect: Blunt  Level of Consciousness: Alert  Frequency of Checks: 4 times per hour, close  Mood:Normal: No  Mood:  (withdrawn refuses to interact)  Motor Activity:Normal: No  Motor Activity: Decreased  Eye Contact: Poor  Observed Behavior: Withdrawn, Guarded  Sexual Misconduct History: Current - no  Preception: Central to person (refuses assessment)  Attention:Normal: No  Attention: Distractible, Unable to concentrate  Thought Processes: Other (comment)  Thought Content:Normal: No  Thought Content: Other (comment) (unable to assess)  Depression Symptoms: Appetite change, Change in energy level, Isolative, Impaired concentration  Anxiety Symptoms: Generalized  Sheila Symptoms: No problems

## 2024-11-01 NOTE — PLAN OF CARE
Problem: Psychosis  Goal: Will report no hallucinations or delusions  Description: INTERVENTIONS:  1. Administer medication as  ordered  2. Assist with reality testing to support increasing orientation  3. Assess if patient's hallucinations or delusions are encouraging self harm or harm to others and intervene as appropriate  Outcome: Progressing    Pt isolates to room, often found asleep. Pt is uncooperative and not interactive, ignoring attempts to perform vitals signs and assessments. Appears sad and avoids gaze. Pt was agreeable to take scheduled medication. Pt ate evening snack and hygiene was strongly encouraged as pt presents disheveled and malodorous. Unable to assess if pt is experiencing any delusions or hallucinations. Does not appear to respond to internal stimuli.

## 2024-11-01 NOTE — GROUP NOTE
Group Therapy Note    Date: 11/1/2024    Group Start Time: 1300  Group End Time: 1330  Group Topic: Relapse Prevention    Kristin Glaser    Relapse Prevention Group Note    Attendees: 3/7        Comments:    Patient did not participate in Relapse Prevention Group, despite staff encouragement and explanation of benefits.  Patient remains seclusive to self.  Q15 minute safety checks maintained for patient safety and will continue to encourage patient to attend unit programming.

## 2024-11-02 LAB
AMMONIA PLAS-SCNC: 21 UMOL/L (ref 16–60)
ANION GAP SERPL CALCULATED.3IONS-SCNC: 8 MMOL/L (ref 9–16)
BASOPHILS # BLD: 0.1 K/UL (ref 0–0.2)
BASOPHILS NFR BLD: 1 % (ref 0–2)
BUN SERPL-MCNC: 12 MG/DL (ref 6–20)
CALCIUM SERPL-MCNC: 9.1 MG/DL (ref 8.6–10.4)
CHLORIDE SERPL-SCNC: 106 MMOL/L (ref 98–107)
CHOLEST SERPL-MCNC: 128 MG/DL (ref 0–199)
CHOLESTEROL/HDL RATIO: 3.9
CO2 SERPL-SCNC: 31 MMOL/L (ref 20–31)
COHGB MFR BLD: 2.4 % (ref 0–5)
CREAT SERPL-MCNC: 1.2 MG/DL (ref 0.7–1.2)
EOSINOPHIL # BLD: 0.2 K/UL (ref 0–0.4)
EOSINOPHILS RELATIVE PERCENT: 2 % (ref 0–4)
ERYTHROCYTE [DISTWIDTH] IN BLOOD BY AUTOMATED COUNT: 13.7 % (ref 11.5–14.9)
GFR, ESTIMATED: 78 ML/MIN/1.73M2
GLUCOSE SERPL-MCNC: 102 MG/DL (ref 74–99)
HCO3 VENOUS: 28 MMOL/L (ref 24–30)
HCT VFR BLD AUTO: 38.2 % (ref 41–53)
HDLC SERPL-MCNC: 33 MG/DL
HGB BLD-MCNC: 13 G/DL (ref 13.5–17.5)
LDLC SERPL CALC-MCNC: 73 MG/DL (ref 0–100)
LYMPHOCYTES NFR BLD: 3.5 K/UL (ref 1–4.8)
LYMPHOCYTES RELATIVE PERCENT: 36 % (ref 24–44)
MCH RBC QN AUTO: 30.8 PG (ref 26–34)
MCHC RBC AUTO-ENTMCNC: 34 G/DL (ref 31–37)
MCV RBC AUTO: 90.6 FL (ref 80–100)
METHEMOGLOBIN: 0.1 % (ref 0–1.9)
MONOCYTES NFR BLD: 0.7 K/UL (ref 0.1–1.3)
MONOCYTES NFR BLD: 8 % (ref 1–7)
NEUTROPHILS NFR BLD: 53 % (ref 36–66)
NEUTS SEG NFR BLD: 5.2 K/UL (ref 1.3–9.1)
O2 SAT, VEN: 59.4 % (ref 60–85)
PCO2 VENOUS: 47.2 MM HG (ref 39–55)
PH VENOUS: 7.39 (ref 7.32–7.42)
PLATELET # BLD AUTO: 129 K/UL (ref 150–450)
PMV BLD AUTO: 11.6 FL (ref 6–12)
PO2 VENOUS: 31.5 MM HG (ref 30–50)
POSITIVE BASE EXCESS, VEN: 2.4 MMOL/L (ref 0–2)
POTASSIUM SERPL-SCNC: 3.1 MMOL/L (ref 3.7–5.3)
RBC # BLD AUTO: 4.21 M/UL (ref 4.5–5.9)
SODIUM SERPL-SCNC: 145 MMOL/L (ref 136–145)
TRIGL SERPL-MCNC: 109 MG/DL (ref 0–149)
WBC OTHER # BLD: 9.7 K/UL (ref 3.5–11)

## 2024-11-02 PROCEDURE — 36415 COLL VENOUS BLD VENIPUNCTURE: CPT

## 2024-11-02 PROCEDURE — 99232 SBSQ HOSP IP/OBS MODERATE 35: CPT | Performed by: PSYCHIATRY & NEUROLOGY

## 2024-11-02 PROCEDURE — 6370000000 HC RX 637 (ALT 250 FOR IP): Performed by: INTERNAL MEDICINE

## 2024-11-02 PROCEDURE — 6370000000 HC RX 637 (ALT 250 FOR IP): Performed by: PSYCHIATRY & NEUROLOGY

## 2024-11-02 PROCEDURE — 6370000000 HC RX 637 (ALT 250 FOR IP)

## 2024-11-02 PROCEDURE — 1240000000 HC EMOTIONAL WELLNESS R&B

## 2024-11-02 PROCEDURE — 85025 COMPLETE CBC W/AUTO DIFF WBC: CPT

## 2024-11-02 PROCEDURE — 6370000000 HC RX 637 (ALT 250 FOR IP): Performed by: EMERGENCY MEDICINE

## 2024-11-02 PROCEDURE — 99232 SBSQ HOSP IP/OBS MODERATE 35: CPT | Performed by: INTERNAL MEDICINE

## 2024-11-02 PROCEDURE — 80061 LIPID PANEL: CPT

## 2024-11-02 PROCEDURE — 82805 BLOOD GASES W/O2 SATURATION: CPT

## 2024-11-02 PROCEDURE — 80048 BASIC METABOLIC PNL TOTAL CA: CPT

## 2024-11-02 PROCEDURE — 82140 ASSAY OF AMMONIA: CPT

## 2024-11-02 RX ORDER — POTASSIUM CHLORIDE 1500 MG/1
40 TABLET, EXTENDED RELEASE ORAL ONCE
Status: COMPLETED | OUTPATIENT
Start: 2024-11-02 | End: 2024-11-02

## 2024-11-02 RX ADMIN — POTASSIUM CHLORIDE 20 MEQ: 1500 TABLET, EXTENDED RELEASE ORAL at 08:59

## 2024-11-02 RX ADMIN — RISPERIDONE 1 MG: 1 TABLET, FILM COATED ORAL at 21:20

## 2024-11-02 RX ADMIN — TRAZODONE HYDROCHLORIDE 50 MG: 50 TABLET ORAL at 21:20

## 2024-11-02 RX ADMIN — POTASSIUM CHLORIDE 40 MEQ: 1500 TABLET, EXTENDED RELEASE ORAL at 17:16

## 2024-11-02 RX ADMIN — RISPERIDONE 1 MG: 1 TABLET, FILM COATED ORAL at 08:59

## 2024-11-02 NOTE — PLAN OF CARE
Problem: Psychosis  Goal: Will report no hallucinations or delusions  Description: INTERVENTIONS:  1. Administer medication as  ordered  2. Assist with reality testing to support increasing orientation  3. Assess if patient's hallucinations or delusions are encouraging self harm or harm to others and intervene as appropriate  11/2/2024 0231 by Gianluca OSPINA RN  Outcome: Progressing   Patient refused vitals and assessments. Patient is not interactive with staff or peers. Patient is isolative to room. Patient is compliant with medications.

## 2024-11-02 NOTE — GROUP NOTE
Group Therapy Note    Date: 11/2/2024    Group Start Time: 1005  Group End Time: 1025  Group Topic: Psychoeducation    Ginny Cee MSW, GLADYS        Group Therapy Note    Attendees: 3/10       Patient was offered group therapy today but declined to participate despite encouragement from staff.  1:1 was offered.       Signature:  CINTIA Melgoza LSW

## 2024-11-02 NOTE — PLAN OF CARE
Problem: Risk for Elopement  Goal: Patient will not exit the unit/facility without proper excort  11/2/2024 1207 by Liseth Ramirez RN  Outcome: Progressing  Patient is not exit seeking  Problem: Sheila  Goal: Will exhibit normal sleep and speech and no impulsivity  Description: INTERVENTIONS:  1. Administer medication as ordered  2. Set limits on impulsive behavior  3. Make attempts to decrease external stimuli as possible  11/2/2024 1207 by Liseth Ramirez RN  Outcome: Progressing   Patient is calm, controlled and medication compliant. Patient is flat, guarded and isolative to self/room. Patient is not forth coming with dialogue and is aloof of peers. Patient is eating and sleeping adequately with safety checks Q15min and at irregular intervals.   Problem: Psychosis  Goal: Will report no hallucinations or delusions  Description: INTERVENTIONS:  1. Administer medication as  ordered  2. Assist with reality testing to support increasing orientation  3. Assess if patient's hallucinations or delusions are encouraging self harm or harm to others and intervene as appropriate  11/2/2024 1207 by Liseth Ramirez RN  Outcome: Progressing   Patient is calm, controlled and medication compliant. Patient is flat, guarded and isolative to self/room. Patient is not forth coming with dialogue and is aloof of peers. Patient is eating and sleeping adequately with safety checks Q15min and at irregular intervals.

## 2024-11-03 LAB
AMPHET UR QL SCN: NEGATIVE
BARBITURATES UR QL SCN: NEGATIVE
BENZODIAZ UR QL: NEGATIVE
BILIRUB UR QL STRIP: NEGATIVE
CANNABINOIDS UR QL SCN: NEGATIVE
CLARITY UR: CLEAR
COCAINE UR QL SCN: NEGATIVE
COLOR UR: YELLOW
COMMENT: NORMAL
FENTANYL UR QL: NEGATIVE
GLUCOSE UR STRIP-MCNC: NEGATIVE MG/DL
HGB UR QL STRIP.AUTO: NEGATIVE
KETONES UR STRIP-MCNC: NEGATIVE MG/DL
LEUKOCYTE ESTERASE UR QL STRIP: NEGATIVE
METHADONE UR QL: NEGATIVE
NITRITE UR QL STRIP: NEGATIVE
OPIATES UR QL SCN: NEGATIVE
OXYCODONE UR QL SCN: NEGATIVE
PCP UR QL SCN: NEGATIVE
PH UR STRIP: 6 [PH] (ref 5–8)
PROT UR STRIP-MCNC: NEGATIVE MG/DL
SP GR UR STRIP: 1.02 (ref 1–1.03)
TEST INFORMATION: NORMAL
UROBILINOGEN UR STRIP-ACNC: NORMAL EU/DL (ref 0–1)

## 2024-11-03 PROCEDURE — 6370000000 HC RX 637 (ALT 250 FOR IP): Performed by: PSYCHIATRY & NEUROLOGY

## 2024-11-03 PROCEDURE — 99232 SBSQ HOSP IP/OBS MODERATE 35: CPT | Performed by: PSYCHIATRY & NEUROLOGY

## 2024-11-03 PROCEDURE — 81003 URINALYSIS AUTO W/O SCOPE: CPT

## 2024-11-03 PROCEDURE — 99232 SBSQ HOSP IP/OBS MODERATE 35: CPT | Performed by: INTERNAL MEDICINE

## 2024-11-03 PROCEDURE — 6370000000 HC RX 637 (ALT 250 FOR IP): Performed by: EMERGENCY MEDICINE

## 2024-11-03 PROCEDURE — 80307 DRUG TEST PRSMV CHEM ANLYZR: CPT

## 2024-11-03 PROCEDURE — 1240000000 HC EMOTIONAL WELLNESS R&B

## 2024-11-03 PROCEDURE — 6370000000 HC RX 637 (ALT 250 FOR IP)

## 2024-11-03 RX ADMIN — RISPERIDONE 1 MG: 1 TABLET, FILM COATED ORAL at 21:07

## 2024-11-03 RX ADMIN — TRAZODONE HYDROCHLORIDE 50 MG: 50 TABLET ORAL at 21:07

## 2024-11-03 RX ADMIN — RISPERIDONE 1 MG: 1 TABLET, FILM COATED ORAL at 08:15

## 2024-11-03 RX ADMIN — POTASSIUM CHLORIDE 20 MEQ: 1500 TABLET, EXTENDED RELEASE ORAL at 08:15

## 2024-11-03 RX ADMIN — ALUMINUM HYDROXIDE, MAGNESIUM HYDROXIDE, AND SIMETHICONE 30 ML: 200; 200; 20 SUSPENSION ORAL at 17:11

## 2024-11-03 NOTE — PLAN OF CARE
Patient assessed in his room. Denies suicidal/homicidal ideations, denies hallucinations. Guarded and evasive with one word responses to questions. Denies any physical concerns. Will continue to monitor for safety and changes in mental status.    Problem: Risk for Elopement  Goal: Patient will not exit the unit/facility without proper excort  Outcome: Progressing     Problem: Sheila  Goal: Will exhibit normal sleep and speech and no impulsivity  Description: INTERVENTIONS:  1. Administer medication as ordered  2. Set limits on impulsive behavior  3. Make attempts to decrease external stimuli as possible  Outcome: Progressing     Problem: Psychosis  Goal: Will report no hallucinations or delusions  Description: INTERVENTIONS:  1. Administer medication as  ordered  2. Assist with reality testing to support increasing orientation  3. Assess if patient's hallucinations or delusions are encouraging self harm or harm to others and intervene as appropriate  Outcome: Progressing

## 2024-11-03 NOTE — PLAN OF CARE
Problem: Risk for Elopement  Goal: Patient will not exit the unit/facility without proper excort  11/3/2024 1012 by Liseth Ramirez RN  Outcome: Progressing   Patient is not exit seeking  Problem: Sheila  Goal: Will exhibit normal sleep and speech and no impulsivity  Description: INTERVENTIONS:  1. Administer medication as ordered  2. Set limits on impulsive behavior  3. Make attempts to decrease external stimuli as possible  11/3/2024 1012 by Liseth Ramirez RN  Outcome: Progressing   Patient is calm, controlled and medication compliant. Patient is flat, guarded and isolative to self/room. Patient is selective with peers and focused on discharge.  Patient is eating and sleeping adequately with safety checks Q15min and at irregular intervals.   Problem: Psychosis  Goal: Will report no hallucinations or delusions  Description: INTERVENTIONS:  1. Administer medication as  ordered  2. Assist with reality testing to support increasing orientation  3. Assess if patient's hallucinations or delusions are encouraging self harm or harm to others and intervene as appropriate  11/3/2024 1012 by Liseth Ramirez RN  Outcome: Progressing   Patient is calm, controlled and medication compliant. Patient is flat, guarded and isolative to self/room. Patient is selective with peers and focused on discharge.  Patient is eating and sleeping adequately with safety checks Q15min and at irregular intervals.

## 2024-11-03 NOTE — GROUP NOTE
Group Therapy Note    Date: 11/3/2024    Group Start Time: 1400  Group End Time: 1500  Group Topic: Group Documentation    [facility]    Diana Mcneil LPN        Group Therapy Note    Attendees: 5/10       Patient's Goal:  coping skills at home     Notes:  patient participated in discussing and sharing different coping skills     Status After Intervention:  Improved    Participation Level: Active Listener and Interactive    Participation Quality: Appropriate, Attentive, and Sharing      Speech:  normal       Thought Process/Content: Logical  Linear      Affective Functioning: Congruent      Mood: euthymic      Level of consciousness:  Alert and Oriented x4      Response to Learning: Able to verbalize current knowledge/experience, Able to verbalize/acknowledge new learning, and Capable of insight      Endings: None Reported    Modes of Intervention: Education, Support, and Socialization      Discipline Responsible: Licensed Practical Nurse      Signature:  Diana Mcneil LPN

## 2024-11-04 PROCEDURE — 1240000000 HC EMOTIONAL WELLNESS R&B

## 2024-11-04 PROCEDURE — 6370000000 HC RX 637 (ALT 250 FOR IP): Performed by: PSYCHIATRY & NEUROLOGY

## 2024-11-04 PROCEDURE — APPSS30 APP SPLIT SHARED TIME 16-30 MINUTES: Performed by: NURSE PRACTITIONER

## 2024-11-04 PROCEDURE — 99232 SBSQ HOSP IP/OBS MODERATE 35: CPT | Performed by: PSYCHIATRY & NEUROLOGY

## 2024-11-04 PROCEDURE — 6370000000 HC RX 637 (ALT 250 FOR IP)

## 2024-11-04 RX ADMIN — TRAZODONE HYDROCHLORIDE 50 MG: 50 TABLET ORAL at 20:37

## 2024-11-04 RX ADMIN — RISPERIDONE 1 MG: 1 TABLET, FILM COATED ORAL at 20:37

## 2024-11-04 RX ADMIN — RISPERIDONE 1 MG: 1 TABLET, FILM COATED ORAL at 08:22

## 2024-11-04 RX ADMIN — ALUMINUM HYDROXIDE, MAGNESIUM HYDROXIDE, AND SIMETHICONE 30 ML: 200; 200; 20 SUSPENSION ORAL at 18:23

## 2024-11-04 RX ADMIN — HYDROXYZINE HYDROCHLORIDE 50 MG: 50 TABLET, FILM COATED ORAL at 20:37

## 2024-11-04 NOTE — GROUP NOTE
Group Therapy Note    Date: 11/4/2024    Group Start Time: 1000  Group End Time: 1025  Group Topic: Psychotherapy    [facility] Stella Du MSW, GLADYS        Group Therapy Note    Attendees: 4/10       Patient's Goal:  Increase interpersonal relationship skills utilizing talk therapy while discussing strengths and how to use them daily.      Status After Intervention:  Unchanged    Participation Level: Active Listener and Interactive    Participation Quality: Appropriate      Speech:  normal      Thought Process/Content: Logical      Affective Functioning: Flat      Mood: depressed      Level of consciousness:  Attentive      Response to Learning: Able to verbalize current knowledge/experience      Endings: None Reported    Modes of Intervention: Education, Support, and Socialization      Discipline Responsible: /Counselor      Signature:  CITNIA Haas, GLADYS

## 2024-11-04 NOTE — GROUP NOTE
Group Therapy Note    Date: 11/4/2024    Group Start Time: 1100  Group End Time: 1145  Group Topic: Psychoeducation    [facility]    Pao Baker CTRS    Group Therapy Note    Attendees: 4/10     Patient's Goal:  Patient will demonstrate improved interpersonal skills    Notes:  Patient attended group and participated    Status After Intervention:  Improved    Participation Level: Active Listener and Interactive    Participation Quality: Appropriate, Attentive, Sharing, and Supportive      Speech:  normal      Thought Process/Content: Logical  Linear      Affective Functioning: Constricted/Restricted      Mood: depressed      Level of consciousness:  Alert, Oriented x4, and Attentive      Response to Learning: Able to verbalize current knowledge/experience, Able to verbalize/acknowledge new learning, Able to change behavior, and Progressing to goal      Endings: None Reported    Modes of Intervention: Education, Support, Socialization, and Exploration      Discipline Responsible: Psychoeducational Specialist      Signature:  MAGALI Mercedes

## 2024-11-04 NOTE — PLAN OF CARE
Patient assessed in his room. Denies hallucinations, denies suicidal/homicidal ideations. Denies physical concerns. Flat, bizarre. Will continue to monitor for safety.    Problem: Risk for Elopement  Goal: Patient will not exit the unit/facility without proper excort  Outcome: Progressing     Problem: Sheila  Goal: Will exhibit normal sleep and speech and no impulsivity  Description: INTERVENTIONS:  1. Administer medication as ordered  2. Set limits on impulsive behavior  3. Make attempts to decrease external stimuli as possible  Outcome: Progressing     Problem: Psychosis  Goal: Will report no hallucinations or delusions  Description: INTERVENTIONS:  1. Administer medication as  ordered  2. Assist with reality testing to support increasing orientation  3. Assess if patient's hallucinations or delusions are encouraging self harm or harm to others and intervene as appropriate  Outcome: Progressing

## 2024-11-04 NOTE — GROUP NOTE
Psych-Ed/Relapse Prevention Group Note        Date: November 4, 2024 Start Time: 1:30pm  End Time: 2:30pm      Number of Participants in Group & Unit Census:  3/7    Topic: Leisure skills    Goal of Group:Patient will identify benefits of creative expression for coping and stress management      Comments:     Patient did not participate in Psych-Ed/Relapse Prevention group, despite staff encouragement and explanation of benefits.  Patient remain seclusive to self.  Q15 minute safety checks maintained for patient safety and will continue to encourage patient to attend unit programming.       Signature:  Pao Baker, CTRS

## 2024-11-04 NOTE — PLAN OF CARE
Problem: Risk for Elopement  Goal: Patient will not exit the unit/facility without proper excort  11/4/2024 1445 by Jayy Abraham, RN  Outcome: Progressing     Problem: Sheila  Goal: Will exhibit normal sleep and speech and no impulsivity  Description: INTERVENTIONS:  1. Administer medication as ordered  2. Set limits on impulsive behavior  3. Make attempts to decrease external stimuli as possible  11/4/2024 1445 by Jayy Abraham, RN  Outcome: Progressing     Problem: Psychosis  Goal: Will report no hallucinations or delusions  Description: INTERVENTIONS:  1. Administer medication as  ordered  2. Assist with reality testing to support increasing orientation  3. Assess if patient's hallucinations or delusions are encouraging self harm or harm to others and intervene as appropriate  11/4/2024 1445 by Jayy Abraham, RN  Outcome: Progressing    Patient denies suicidal thoughts, thoughts to self harm, and has remained free from self injury thus far.  Patient endorses increased anxiety and feelings of depression, but reports them to be improving as he finds himself able to socialize more this shift.  Patient endorses auditory hallucinations during 1:1 conversation stating \"nothing in specific, just there\" Patient appears with flat affect during conversation.  Patient encouraged to seek staff for any needs or concerns that may arise.  Safe environment maintained.  Safety checks in place q15 min per protocol.

## 2024-11-05 VITALS
HEIGHT: 68 IN | SYSTOLIC BLOOD PRESSURE: 142 MMHG | OXYGEN SATURATION: 100 % | WEIGHT: 187 LBS | BODY MASS INDEX: 28.34 KG/M2 | RESPIRATION RATE: 14 BRPM | HEART RATE: 63 BPM | DIASTOLIC BLOOD PRESSURE: 91 MMHG | TEMPERATURE: 98.2 F

## 2024-11-05 PROCEDURE — 6370000000 HC RX 637 (ALT 250 FOR IP)

## 2024-11-05 PROCEDURE — 6370000000 HC RX 637 (ALT 250 FOR IP): Performed by: PSYCHIATRY & NEUROLOGY

## 2024-11-05 PROCEDURE — 99239 HOSP IP/OBS DSCHRG MGMT >30: CPT | Performed by: PSYCHIATRY & NEUROLOGY

## 2024-11-05 PROCEDURE — 99232 SBSQ HOSP IP/OBS MODERATE 35: CPT | Performed by: INTERNAL MEDICINE

## 2024-11-05 RX ORDER — RISPERIDONE 1 MG/1
1 TABLET ORAL 2 TIMES DAILY
Qty: 60 TABLET | Refills: 3 | Status: SHIPPED | OUTPATIENT
Start: 2024-11-05

## 2024-11-05 RX ORDER — TRAZODONE HYDROCHLORIDE 50 MG/1
50 TABLET, FILM COATED ORAL NIGHTLY PRN
Qty: 30 TABLET | Refills: 0 | Status: SHIPPED | OUTPATIENT
Start: 2024-11-05

## 2024-11-05 RX ADMIN — RISPERIDONE 1 MG: 1 TABLET, FILM COATED ORAL at 08:52

## 2024-11-05 ASSESSMENT — LIFESTYLE VARIABLES
HOW MANY STANDARD DRINKS CONTAINING ALCOHOL DO YOU HAVE ON A TYPICAL DAY: PATIENT DECLINED
HOW OFTEN DO YOU HAVE A DRINK CONTAINING ALCOHOL: PATIENT DECLINED

## 2024-11-05 NOTE — CARE COORDINATION
SOCIAL SERVICE NOTE:  Notice to court was sent to probate court through secure [employer] e-mail to notify Probate court that patient was discharged from the hospital today.

## 2024-11-05 NOTE — DISCHARGE INSTRUCTIONS
Information:  Medications:   Medication summary provided   I understand that I should take only the medications on my list.     -why and when I need to take each medicine.     -which side effects to watch for.     -that I should carry my medication information at all times in case of     Emergency situations.    I will take all of my medicines to follow up appointments.     -check with my physician or pharmacist before taking any new    Medication, over the counter product or drink alcohol.    -Ask about food, drug or dietary supplement interactions.    -discard old lists and update records with medication providers.    Notify Physician:  Notify physician if you notice:   Always call 911 if you feel your life is in danger  In case of an emergency call 911 immediately!  If 911 is not available call your local emergency medical system for help    Behavioral Health Follow Up:  Original Referral Source: [facility]  Discharge Diagnosis: Hypokalemia [E87.6]  Acute psychosis (HCC) [F23]  Recommendations for Level of Care: Follow up with community mental health clinic for appointments and medications.   Patient status at discharge: Stable on medications   [facility]  [facility]  Aftercare plan faxed: [facility]    -faxed by: Nursing   -date: 11/5/24   -time: 1300  Prescriptions: Walamrt    Smoking: Quit Smoking.   Call the [employer]'s smoking quitline at 3-768-04Y-QUIT  Know the signs of a heart attack   If you have any of the following symptoms call 911 immediately, do not wait more    Than five minutes.    1. Pressure, fullness and/ or squeezing in the center of the chest spreading to    The jaw, neck or shoulder.    2. Chest discomfort with light headedness, fainting, sweating, nausea or    Shortness of breath.   3. Upper abdominal pressure or discomfort.   4. Lower chest pain, back pain, unusual fatigue, shortness of breath, nausea   Or dizziness.     General Information:   Questions regarding your bill: Call HELP

## 2024-11-05 NOTE — GROUP NOTE
Group Therapy Note    Date: 11/5/2024    Group Start Time: 1000  Group End Time: 1045  Group Topic: Psychotherapy    [facility] MSW, LSW        Group Therapy Note    Attendees:4/8       Patient's Goal:  Increase interpersonal relationship skills utilizing talk therapy while discussing positive coping skills for moments of frustration.      Status After Intervention:  Unchanged    Participation Level: Interactive    Participation Quality: Attentive      Speech:  normal      Thought Process/Content: Linear      Affective Functioning: Flat      Mood: depressed      Level of consciousness:  Preoccupied      Response to Learning: Able to verbalize current knowledge/experience      Endings: None Reported    Modes of Intervention: Education, Support, and Socialization      Discipline Responsible: /Counselor      Signature:  CINTIA Haas LSW

## 2024-11-05 NOTE — GROUP NOTE
Group Therapy Note    Date: 11/5/2024    Group Start Time: 1330  Group End Time: 1415  Group Topic: Psychoeducation    [facility] Pao Morrissey CTRS    Group Therapy Note    Attendees: 3/8     Patient's Goal:  Patient will demonstrate improved interpersonal skills    Notes:  Patient attended group and participated    Status After Intervention:  Improved    Participation Level: Active Listener and Interactive    Participation Quality: Appropriate, Attentive, Sharing, and Supportive      Speech:  normal      Thought Process/Content: Logical  Linear      Affective Functioning: Constricted/Restricted      Mood: euthymic      Level of consciousness:  Alert, Oriented x4, and Attentive      Response to Learning: Able to verbalize current knowledge/experience, Able to verbalize/acknowledge new learning, Able to change behavior, and Progressing to goal      Endings: None Reported    Modes of Intervention: Education, Support, Socialization, and Exploration      Discipline Responsible: Psychoeducational Specialist      Signature:  MAGALI Merecdes

## 2024-11-05 NOTE — GROUP NOTE
Group Therapy Note    Date: 11/5/2024    Group Start Time: 1100  Group End Time: 1135  Group Topic: Psychoeducation    [facility]    Pao Baker CTRS    Group Therapy Note    Attendees: 2/8     Patient's Goal:  Patient will identify benefits of leisure for coping and stress management    Notes:  Patient attended group and participated.  Patient needs frequent redirection and prompting.  Patient states \"I am trying to do this crap while I'm all fucked up.\"    Status After Intervention:  Improved    Participation Level: Active Listener and Interactive    Participation Quality: Appropriate, Attentive, and Sharing      Speech:  normal      Thought Process/Content: Logical      Affective Functioning: Constricted/Restricted      Mood: dysphoric      Level of consciousness:  Alert and Attentive      Response to Learning: Able to verbalize current knowledge/experience, Able to verbalize/acknowledge new learning, Able to change behavior, and Progressing to goal      Endings: None Reported    Modes of Intervention: Education, Support, Socialization, and Exploration      Discipline Responsible: Psychoeducational Specialist      Signature:  MAGALI Mercedes

## 2024-11-05 NOTE — PLAN OF CARE
Problem: Sheila  Goal: Will exhibit normal sleep and speech and no impulsivity  Description: INTERVENTIONS:  1. Administer medication as ordered  2. Set limits on impulsive behavior  3. Make attempts to decrease external stimuli as possible  11/4/2024 2310 by Olga Barreto LPN  Outcome: Progressing     Problem: Psychosis  Goal: Will report no hallucinations or delusions  Description: INTERVENTIONS:  1. Administer medication as  ordered  2. Assist with reality testing to support increasing orientation  3. Assess if patient's hallucinations or delusions are encouraging self harm or harm to others and intervene as appropriate  11/4/2024 2310 by Olga Barreto LPN  Outcome: Progressing  Patient denies auditory/visual hallucinations. Patient denies thoughts to harm self/others. Patient endorses anxiety and depression. Patient described mood as \"terribly sad\". Patient stated \"Life sucks. I'm fucked\". Patient is withdrawn and irritable. Patient expressed being stressed out, stated \"I lost my coat, my wallet, my phone. I'm homeless\". 1:1 emotional support provided. Patient is cooperative and compliant with medications. Q15M checks continue per policy and safety maintained.    Problem: Risk for Elopement  Goal: Patient will not exit the unit/facility without proper excort  11/4/2024 2310 by Olga Barreto LPN  Outcome: Progressing

## 2024-11-05 NOTE — DISCHARGE SUMMARY
[address] [facility] [place] [facility] [address]      Phone: [phone]   risperiDONE 1 MG tablet  traZODone 50 MG tablet               Core Measures statement:   Not applicable      TIME SPENT - 35 MINUTES TO COMPLETE THE EVALUATION, DISCHARGE SUMMARY, MEDICATION RECONCILIATION AND FOLLOW UP CARE                                         Tino Sanchez is a 39 y.o. male being evaluated FACE TO FACE    --PORTIA ROONEY MD on 11/5/2024 at 12:01 PM    An electronic signature was used to authenticate this note.     **This report has been created using voice recognition software. It may contain minor errors which are inherent in voice recognition technology.**

## 2024-11-05 NOTE — PROGRESS NOTES
Behavioral Services  Medicare Certification Upon Admission    I certify that this patient's inpatient psychiatric hospital admission is medically necessary for:    [x] (1) Treatment which could reasonably be expected to improve this patient's condition,       [x] (2) Or for diagnostic study;     AND     [x](2) The inpatient psychiatric services are provided while the individual is under the care of a physician and are included in the individualized plan of care.    Estimated length of stay/service 2-9 days    Plan for post-hospital care -outpatient care    Electronically signed by PORTIA ROONEY MD on 10/30/2024 at 9:06 AM      
   11/04/24 1359   Encounter Summary   Encounter Overview/Reason  Encounter   Service Provided For Patient   Referral/Consult From Rounding   Last Encounter  11/04/24   Rituals, Rites and Sacraments   Type Blessings  (Fr Schaefer)       
  Daily Progress Note  10/31/2024    Patient Name: Tino Sanchez    CHIEF COMPLAINT: Acute psychosis         SUBJECTIVE:    Tino was seen for follow-up assessment today.  He refused morning dose of Risperdal.  He has not required any emergency medication for agitation in the past 24 hours.  Nursing staff report he has been isolative and refusing assessments.  He is aloof and not interacting with staff.  He was resting in bed on approach and responded to verbal stimuli.  He was selectively mute and refused to answer any of writer's questions however he did maintain eye contact.  Writer made multiple attempts at engaging him in conversation.  It is noted patient did attend group later in the day.  At this time patient has yet to demonstrate stability and warrants further hospitalization for safety and stabilization.    Appetite:  [x] Adequate/Unchanged  [] Increased  [] Decreased      Sleep:       [] Adequate/Unchanged  [x] Fair  [] Poor      Group Attendance on Unit:   [] Yes   [x] Selectively    [] No    Compliant with scheduled medications: [] Yes  [x] No    Received emergency medications in past 24 hrs: [] Yes   [x] No    Medication Side Effects: Denies         Mental Status Exam  Level of consciousness: Awake and alert  Appearance:  Appropriate attire, resting in bed, fair grooming   Behavior/Motor: Approachable, refuses to engage with interviewer, calm  Attitude toward examiner:  Uncooperative, inattentive, improved eye contact  Speech: Patient refuses to speak  Mood: Constricted  Affect: Blunted  Thought processes: Remains disorganized  Thought content: Denies suicidal ideations while in ED              Homicidal ideation: Patient has expressed no threats of violence towards staff or peers              Possible AV hallucinations; patient responding to internal stimuli in the ED              Exhibits delusions              Exhibits paranoia  Cognition:  Unable to assess due to patient's refusal to 
  Daily Progress Note  11/1/2024    Patient Name: Tino Sanchez    CHIEF COMPLAINT:  Acute Psychosis          SUBJECTIVE:      Patient is seen today for a follow up assessment. Vitals and labs reviewed however patient has been refusing all nursing assessments it appears. He was compliant with scheduled Risperdal.  He has not required any emergency medications in the past 24 hours.  Tino is seen at bedside today.  He is withdrawn and at first ignores writers attempts to engage.  Discussed with Tino the importance of cooperating with assessments and he finally engages reluctantly.  Asked patient about events leading to his admission and he states \"I cannot remember.\"  He reports he cannot remember who brought him into the hospital.  He does admit to depression and suicidal thoughts and states \"I wish I were dead.\"  He reports his sleep is poor and states he is having trouble both falling asleep and staying asleep.  His appetite seems poor however he did eat breakfast this morning.  Tino endorses auditory hallucinations however he will not elaborate only that he is having them.  He denies visual hallucinations.  He is guarded and appears paranoid.  He seems to lack insight into his mental health and reasons for hospitalization. He continues to require inpatient hospitalization for his safety and stability.    Appetite:  [] Normal/Adequate/Unchanged  [] Increased  [x] Decreased      Sleep:       [] Normal/Adequate/Unchanged  [] Fair  [x] Poor      Group Attendance on Unit:   [] Yes  [] Selectively    [x] No    Medication Side Effects:  Patient denies any medication side effects at the time of assessment.         Mental Status Exam  Level of consciousness: Alert and awake.   Appearance: Appropriate attire for setting, resting in bed, with poor grooming and hygiene.   Behavior/Motor: Guarded, evasive  Attitude toward examiner: Semi-cooperative, inattentive, poor eye contact  Speech: Very low volume, 
  Daily Progress Note  11/2/2024    Patient Name: Tino Sanchez    CHIEF COMPLAINT:  Acute Psychosis          SUBJECTIVE:      Patient is seen today for a follow up assessment.  The patient is awake.  He is making eye contact and answering some questions with yes or no answers but mostly not answering my questions.  The patient has maintained behavioral control. He has the patient is taking his prescribed risperidone.  He has some ambivalence and appears perplexed.  We will try Ativan 1 mg twice daily to target any catatonic features  Vitals:    10/29/24 1459 10/29/24 2318 11/01/24 1014 11/02/24 0815   BP:    139/81   Pulse:    54   Resp: 18 16 14 14   Temp:    97.7 °F (36.5 °C)   TempSrc: Oral Temporal  Oral   SpO2: 97% 98%  99%   Weight:  84.8 kg (187 lb)     Height:  1.727 m (5' 8\")         Appetite:  [] Normal/Adequate/Unchanged  [] Increased  [x] Decreased      Sleep:       [] Normal/Adequate/Unchanged  [] Fair  [x] Poor      Group Attendance on Unit:   [] Yes  [] Selectively    [x] No    Medication Side Effects:  Patient denies any medication side effects at the time of assessment.         Mental Status Exam  Level of consciousness: Alert and awake.   Appearance: Appropriate attire for setting, resting in bed, with poor grooming and hygiene.   Behavior/Motor: Guarded, evasive  Attitude toward examiner: Semi-cooperative, inattentive, poor eye contact  Speech: Very low volume, monotone, one-word answers  Mood: Constricted  Affect: Blunted  Thought processes: Poverty of thought   thought content: Denies homicidal ideation.  Suicidal Ideation: Unable to assess suicidal ideations  Delusions: Patient appears paranoid  Perceptual Disturbance: Patient does not appear to be responding to internal stimuli. Endorses auditory hallucinations. Denies visual hallucinations.   Cognition: Oriented to self, location, time, and situation.  Memory: Intact.  Insight & Judgement: Poor.     Data   height is 1.727 m (5' 8\") and 
IN-PATIENT SERVICE  [facility] Internal Medicine    CONSULTATION / HISTORY AND PHYSICAL EXAMINATION            Date:   11/3/2024  Patient name:  Tino Sanchez  Date of admission:  10/29/2024  3:01 PM  MRN:   [id number]  Account:  [id number]  YOB: 1985  PCP:    Roel Major MD  Room:   [address] [place] [address]  Code Status:    Full Code    Physician Requesting Consult: Edward Reese MD    Reason for Consult:  medical management    Chief Complaint:     Chief Complaint   Patient presents with    Mental Health Problem       History Obtained From:     Patient medical record nursing staff    History of Present Illness:   Patient unfortunately is very poor historian, history is mainly retrieved from EHR  Patient, has past medical history of GERD, hypokalemia, he was brought to emergency room by his father, he approaches father at home unannounced, and he was not making any sense, he had disorganized thought process, patient denying suicidal ideation  Patient was brought to emergency room admitted to [facility] floor with diagnosis acute psychosis  Never diagnosed with chronic medical condition like diabetes, hypertension, coronary artery disease  No complaints of chest pain, shortness of breath    Past Medical History:     Past Medical History:   Diagnosis Date    Mononucleosis     as child        Past Surgical History:     Past Surgical History:   Procedure Laterality Date    WISDOM TOOTH EXTRACTION Left     bottom        Medications Prior to Admission:     Prior to Admission medications    Medication Sig Start Date End Date Taking? Authorizing Provider   melatonin 3 MG TABS tablet Take 1 tablet by mouth nightly as needed (sleep)   Yes Provider, MD Jia        Allergies:     Patient has no known allergies.    Social History:     Tobacco:    reports that he has been smoking cigarettes. He has a 20 pack-year smoking history. He has never used smokeless tobacco.  Alcohol:      reports no history 
IN-PATIENT SERVICE  [facility] Internal Medicine    CONSULTATION / HISTORY AND PHYSICAL EXAMINATION            Date:   11/2/2024  Patient name:  Tino Sanchez  Date of admission:  10/29/2024  3:01 PM  MRN:   [id number]  Account:  [id number]  YOB: 1985  PCP:    Roel Major MD  Room:   [address] [place] [address]  Code Status:    Full Code    Physician Requesting Consult: Edward Reese MD    Reason for Consult:  medical management    Chief Complaint:     Chief Complaint   Patient presents with    Mental Health Problem       History Obtained From:     Patient medical record nursing staff    History of Present Illness:   Patient unfortunately is very poor historian, history is mainly retrieved from EHR  Patient, has past medical history of GERD, hypokalemia, he was brought to emergency room by his father, he approaches father at home unannounced, and he was not making any sense, he had disorganized thought process, patient denying suicidal ideation  Patient was brought to emergency room admitted to [facility] floor with diagnosis acute psychosis  Never diagnosed with chronic medical condition like diabetes, hypertension, coronary artery disease  No complaints of chest pain, shortness of breath    Past Medical History:     Past Medical History:   Diagnosis Date    Mononucleosis     as child        Past Surgical History:     Past Surgical History:   Procedure Laterality Date    WISDOM TOOTH EXTRACTION Left     bottom        Medications Prior to Admission:     Prior to Admission medications    Medication Sig Start Date End Date Taking? Authorizing Provider   melatonin 3 MG TABS tablet Take 1 tablet by mouth nightly as needed (sleep)   Yes Provider, MD Jia        Allergies:     Patient has no known allergies.    Social History:     Tobacco:    reports that he has been smoking cigarettes. He has a 20 pack-year smoking history. He has never used smokeless tobacco.  Alcohol:      reports no history 
IN-PATIENT SERVICE  [facility] Medicine    CONSULTATION / HISTORY AND PHYSICAL EXAMINATION            Date:   11/5/2024  Patient name:  Tino Sanchez  Date of admission:  10/29/2024  3:01 PM  MRN:   [id number]  Account:  [id number]  YOB: 1985  PCP:    Roel Major MD  Room:   [address] [place] [address]  Code Status:    Full Code    Physician Requesting Consult: Edward Reese MD    Reason for Consult:  medical management    Chief Complaint:     Chief Complaint   Patient presents with    Mental Health Problem       History Obtained From:     Patient medical record nursing staff    History of Present Illness:   Patient unfortunately is very poor historian, history is mainly retrieved from EHR  Patient, has past medical history of GERD, hypokalemia, he was brought to emergency room by his father, he approaches father at home unannounced, and he was not making any sense, he had disorganized thought process, patient denying suicidal ideation  Patient was brought to emergency room admitted to [facility] floor with diagnosis acute psychosis  Never diagnosed with chronic medical condition like diabetes, hypertension, coronary artery disease  No complaints of chest pain, shortness of breath    Past Medical History:     Past Medical History:   Diagnosis Date    Mononucleosis     as child        Past Surgical History:     Past Surgical History:   Procedure Laterality Date    WISDOM TOOTH EXTRACTION Left     bottom        Medications Prior to Admission:     Prior to Admission medications    Medication Sig Start Date End Date Taking? Authorizing Provider   risperiDONE (RISPERDAL) 1 MG tablet Take 1 tablet by mouth 2 times daily 11/5/24  Yes Edward Reese MD   traZODone (DESYREL) 50 MG tablet Take 1 tablet by mouth nightly as needed for Sleep 11/5/24  Yes Edward Reese MD        Allergies:     Patient has no known allergies.    Social History:     Tobacco:    reports that he has been smoking 
Patient, did not cooperate with H&P exam  Will try to revisit tomorrow  
Pharmacy Medication History Note      List of current medications patient is taking is complete.    Source of information: PHILIPJacek PAGAN [phone] (spoke to [address]), patient    Changes made to medication list:  Medications removed (include reason, ex. therapy complete or physician discontinued, noncompliance):  Omeprazole- not taking  Aspirin- list clean up     Medications flagged for provider review:  None    Medications added/doses adjusted:  Melatonin 3 mg nightly prn    Other notes (ex. Recent course of antibiotics, Coumadin dosing):  OARRS negative  [facility] hasn't filled any prescriptions for patient in years. Patient reports taking no prescription medications.      Current Home Medication List at Time of Admission:  Prior to Admission medications    Medication Sig   melatonin 3 MG TABS tablet Take 1 tablet by mouth nightly as needed (sleep)         Please let me know if you have any questions about this encounter. Thank you!    Electronically signed by Stefanie Bain RPH on 10/29/2024 at 4:49 PM    
RT ASSESSMENT TREATMENT GOALS    [x]Pt Goal:  Pt will identify 1-2 positive coping skills by time of discharge.    []Pt Goal:  Pt will identify 1-2 positive aspects of self by time of discharge.    [x]Pt Goal:  Pt will remain on task/topic for 15-30 minutes during group by time of discharge.    []Pt Goal:  Pt will identify 1-2 aspects of relapse prevention plan by time of discharge.    []Pt Goal:  Pt will join in conversation with peers 1-2 times per group by time of discharge.    []Pt Goal:  Pt will identify 1-2 new leisure interests by time of discharge.    [x]Pt Goal:  Pt will not voice any delusional content by time of discharge.    
   Color, UA 11/03/2024 Yellow  Yellow Final    Turbidity UA 11/03/2024 Clear  Clear Final    Glucose, Ur 11/03/2024 NEGATIVE  NEGATIVE mg/dL Final    Bilirubin, Urine 11/03/2024 NEGATIVE  NEGATIVE Final    Ketones, Urine 11/03/2024 NEGATIVE  NEGATIVE mg/dL Final    Specific Gravity, UA 11/03/2024 1.020  1.000 - 1.030 Final    Urine Hgb 11/03/2024 NEGATIVE  NEGATIVE Final    pH, Urine 11/03/2024 6.0  5.0 - 8.0 Final    Protein, UA 11/03/2024 NEGATIVE  NEGATIVE mg/dL Final    Urobilinogen, Urine 11/03/2024 Normal  0.0 - 1.0 EU/dL Final    Nitrite, Urine 11/03/2024 NEGATIVE  NEGATIVE Final    Leukocyte Esterase, Urine 11/03/2024 NEGATIVE  NEGATIVE Final    Comment 11/03/2024 Microscopic exam not performed based on chemical results unless requested in original order.   Final    Amphetamine Screen, Ur 11/03/2024 NEGATIVE  NEGATIVE Final    Cutoff: 1000 ng/mL    Barbiturate Screen, Ur 11/03/2024 NEGATIVE  NEGATIVE Final    Cutoff: 200 ng/ml    Benzodiazepine Screen, Urine 11/03/2024 NEGATIVE  NEGATIVE Final    Cutoff: 200 ng/ml    Cocaine Metabolite, Urine 11/03/2024 NEGATIVE  NEGATIVE Final    Cutoff: 300 ng/ml    Methadone Screen, Urine 11/03/2024 NEGATIVE  NEGATIVE Final    Cutoff: 300 ng/ml    Opiates, Urine 11/03/2024 NEGATIVE  NEGATIVE Final    Cutoff: 300 ng/ml    Phencyclidine, Urine 11/03/2024 NEGATIVE  NEGATIVE Final    Cutoff: 25 ng/ml    Cannabinoid Scrn, Ur 11/03/2024 NEGATIVE  NEGATIVE Final    Cutoff: 50 ng/ml    Oxycodone Screen, Ur 11/03/2024 NEGATIVE  NEGATIVE Final    Cutoff: 100 ng/ml    Fentanyl, Ur 11/03/2024 NEGATIVE  NEGATIVE Final    Cutoff: 5 ng/ml    Test Information 11/03/2024 This method is a screening test to detect only these drug classes as part of a medical workup. Confirmatory testing by another method should be ordered if clinically indicated.   Final         Reviewed patient's current plan of care and vital signs with nursing staff.    Labs reviewed: [x] Yes      Medications  Current 
health problem.  He has been hearing voices at the time of admission but is feeling better.  He signed an ADAN to talk to his father.  The patient expected length of stay is 1 day     PLAN  Medications as noted above  Attempt to develop insight  Psycho-education conducted.  Supportive Therapy conducted.  Follow-up daily while on inpatient unit    Electronically signed by PORTIA ROONEY MD on 11/4/24 at 6:16 PM EST

## 2024-11-06 NOTE — CARE COORDINATION
Name: Tino Sanchez    : 1985    Auth number: [id number]     Discharge Date: 2024    Destination: Private residence    Discharge Medications:      Medication List        START taking these medications      risperiDONE 1 MG tablet  Commonly known as: RISPERDAL  Take 1 tablet by mouth 2 times daily  Notes to patient: For mood     traZODone 50 MG tablet  Commonly known as: DESYREL  Take 1 tablet by mouth nightly as needed for Sleep  Notes to patient: For sleep            STOP taking these medications      melatonin 3 MG Tabs tablet     omeprazole 20 MG delayed release capsule  Commonly known as: PRILOSEC               Where to Get Your Medications        These medications were sent to [id number] - [place], [place] - [address] [facility] [phone] - F [phone]  [address] [place] [address]      Phone: [phone]   risperiDONE 1 MG tablet  traZODone 50 MG tablet         Follow Up Appointment: [facility] - MAIN OFFICE  [address] [place] [address]  [phone]  Go on 2024  You have a mental health services intake appointment on  at 9:30AM    If able, please bring a Photo ID and Insurance Card(s)

## 2024-11-06 NOTE — BH NOTE
[facility]  Admission Note     Admission Type:   Involuntary - Not Signed in Upon Admission     Reason for admission:  Reason for Admission: Involuntary patient from [facility] arrived not making any sense, patient making delusional statements such as \"his shoes are radioactive.\" Patient also stated \"staff is not letting him get any oxygen.\" Patient engaging in self talk and responding to internal stimuli. Patient unable to care for self in community.      Addictive Behavior:   Addictive Behavior  In the Past 3 Months, Have You Felt or Has Someone Told You That You Have a Problem With  : None    Medical Problems:   Past Medical History:   Diagnosis Date    Mononucleosis     as child       Status EXAM:  Mental Status and Behavioral Exam  Normal: No  Level of Assistance: Independent/Self  Facial Expression: Avoids Gaze, Flat, Expressionless  Affect: Blunt  Level of Consciousness: Alert  Frequency of Checks: 4 times per hour, close  Mood:Normal: No  Mood: Depressed, Anxious, Suspicious  Motor Activity:Normal: Yes  Eye Contact: Fair  Observed Behavior: Withdrawn, Guarded, Preoccupied  Sexual Misconduct History: Current - no  Preception: [place] to person, [place] to time, [place] to place  Attention:Normal: No  Attention: Distractible  Thought Processes: Unremarkable  Thought Content:Normal: No  Thought Content: Delusions, Paranoia, Preoccupations  Depression Symptoms: Impaired concentration, Loss of interest, Feelings of helplessness, Feelings of hopelessess  Anxiety Symptoms: Generalized  Sheila Symptoms: Poor judgment  Hallucinations: Auditory (comment), Other (comment) (Engaging in self talk)  Delusions: Yes  Delusions: Paranoid  Memory:Normal: No  Memory: Poor recent, Poor remote  Insight and Judgment: No  Insight and Judgment: Poor judgment, Poor insight, Unmotivated    Tobacco Screening:  Practical Counseling, on admission, caitlyn X, if applicable and completed (first 3 are required if patient doesn't 
[facility]  Discharge Note    Pt discharged with followings belongings:   Dental Appliances: None  Vision - Corrective Lenses: Eyeglasses  Hearing Aid: None  Jewelry: None  Body Piercings Removed: No  Clothing: Footwear, Pants, Shirt, Socks  Other Valuables: Money, Keys ($0.47)   Valuables sent home with patient, transported back home by father. Patient educated on aftercare instructions: Follow up with [facility] for appointments and medications.   Information faxed to [facility] by Nursing  at 3:08 PM Patient verbalize understanding of AVS:  Yes.    Status EXAM upon discharge:  Mental Status and Behavioral Exam  Normal: No  Level of Assistance: Independent/Self  Facial Expression: Flat  Affect: Blunt  Level of Consciousness: Alert  Frequency of Checks: 4 times per hour, close  Mood:Normal: No  Mood: Anxious, Depressed, Helpless  Motor Activity:Normal: Yes  Motor Activity: Decreased  Eye Contact: Fair  Observed Behavior: Exit seeking, Guarded, Preoccupied  Sexual Misconduct History: Current - no  Preception: [place] to person, [place] to time, [place] to situation, [place] to place  Attention:Normal: No  Attention: Distractible  Thought Processes: Circumstantial  Thought Content:Normal: No  Thought Content: Preoccupations  Depression Symptoms: Impaired concentration, Isolative  Anxiety Symptoms: Generalized  Sheila Symptoms: No problems reported or observed.  Hallucinations: None  Delusions: No  Delusions: Other (comment) (DAV)  Memory:Normal: No  Memory: Poor recent, Poor remote  Insight and Judgment: No  Insight and Judgment: Poor insight    Tobacco Screening:  Practical Counseling, on admission, caitlyn X, if applicable and completed (first 3 are required if patient doesn't refuse):            ( ) Recognizing danger situations (included triggers and roadblocks)                    ( ) Coping skills (new ways to manage stress,relaxation techniques, changing routine, distraction)            
Group Therapy Note     Date: 11/5/24     Group Start Time: 0930  Group End Time: 1000  Group Topic: Goals & Coping Skills     [facility]      Group Therapy Note        Patient appropriately participated in the morning group activity of demonstrating a coping mechanism for racing thoughts/anxiety. Patient also identified personal goals, discussing the importance of providing reasonable timelines. Patient's made a graph, one side represents things they can control, the other represents things they cannot control. Patient then writes down each of their worries/thoughts on the corresponding side. The activity helps them realize the list for things they cannot control is much longer. The purpose is to provide a visual representation of a person's thoughts.Will continue to monitor and assess patient's progress.      Signature:  Valarie Jimenez LPN  
[facility]-[id number]   
No discharge OQ to complete.   
Patient given quit line phone number [phone] at this time, refusing to call at this time, states \" I just don't want to quit now\"-  dangers of longterm tobacco use discussed.  staff will continue to reinforce importance of smoking cessation.   
Patient given tobacco quitline number [phone] at this time, refusing to call at this time, states \" I just dont want to quit now\"- patient given information as to the dangers of long term tobacco use. Continue to reinforce the importance of tobacco cessation.   
Patient provided U/A specimen cup.  
Patient refused vitals. Will try again after breakfast.   
Safety check completed. No contraband found.  
  Result Value Ref Range    Cholesterol, Total 128 0 - 199 mg/dL    HDL 33 (L) >40 mg/dL    LDL Cholesterol 73 0 - 100 mg/dL    Chol/HDL Ratio 3.9     Triglycerides 109 0 - 149 mg/dL   Basic Metabolic Panel   Result Value Ref Range    Sodium 145 136 - 145 mmol/L    Potassium 3.1 (L) 3.7 - 5.3 mmol/L    Chloride 106 98 - 107 mmol/L    CO2 31 20 - 31 mmol/L    Anion Gap 8 (L) 9 - 16 mmol/L    Glucose 102 (H) 74 - 99 mg/dL    BUN 12 6 - 20 mg/dL    Creatinine 1.2 0.7 - 1.2 mg/dL    Est, Glom Filt Rate 78 >60 mL/min/1.73m2    Calcium 9.1 8.6 - 10.4 mg/dL   CBC with Auto Differential   Result Value Ref Range    WBC 9.7 3.5 - 11.0 k/uL    RBC 4.21 (L) 4.5 - 5.9 m/uL    Hemoglobin 13.0 (L) 13.5 - 17.5 g/dL    Hematocrit 38.2 (L) 41 - 53 %    MCV 90.6 80 - 100 fL    MCH 30.8 26 - 34 pg    MCHC 34.0 31 - 37 g/dL    RDW 13.7 11.5 - 14.9 %    Platelets 129 (L) 150 - 450 k/uL    MPV 11.6 6.0 - 12.0 fL    Neutrophils % 53 36 - 66 %    Lymphocytes % 36 24 - 44 %    Monocytes % 8 (H) 1 - 7 %    Eosinophils % 2 0 - 4 %    Basophils % 1 0 - 2 %    Neutrophils Absolute 5.20 1.3 - 9.1 k/uL    Lymphocytes Absolute 3.50 1.0 - 4.8 k/uL    Monocytes Absolute 0.70 0.1 - 1.3 k/uL    Eosinophils Absolute 0.20 0.0 - 0.4 k/uL    Basophils Absolute 0.10 0.0 - 0.2 k/uL   Ammonia   Result Value Ref Range    Ammonia 21 16 - 60 umol/L   BLOOD GAS, VENOUS   Result Value Ref Range    pH, Manuel 7.391 7.320 - 7.420    pCO2, Manuel 47.2 39.0 - 55.0 mm Hg    PO2, Manuel 31.5 30.0 - 50.0 mm Hg    HCO3, Venous 28.0 24.0 - 30.0 mmol/L    Positive Base Excess, Manuel 2.4 (H) 0.0 - 2.0 mmol/L    O2 Sat, Manuel 59.4 (L) 60.0 - 85.0 %    Carboxyhemoglobin 2.4 0 - 5 %    Methemoglobin 0.1 0.0 - 1.9 %   Urinalysis with Reflex to Culture    Specimen: Urine   Result Value Ref Range    Color, UA Yellow Yellow    Turbidity UA Clear Clear    Glucose, Ur NEGATIVE NEGATIVE mg/dL    Bilirubin, Urine NEGATIVE NEGATIVE    Ketones, Urine NEGATIVE NEGATIVE mg/dL    Specific [place], UA